# Patient Record
Sex: MALE | Race: BLACK OR AFRICAN AMERICAN | URBAN - METROPOLITAN AREA
[De-identification: names, ages, dates, MRNs, and addresses within clinical notes are randomized per-mention and may not be internally consistent; named-entity substitution may affect disease eponyms.]

---

## 2017-01-03 ENCOUNTER — OFFICE VISIT (OUTPATIENT)
Dept: INTERNAL MEDICINE CLINIC | Age: 27
End: 2017-01-03

## 2017-01-03 VITALS
BODY MASS INDEX: 31.34 KG/M2 | DIASTOLIC BLOOD PRESSURE: 80 MMHG | WEIGHT: 211.6 LBS | RESPIRATION RATE: 20 BRPM | HEIGHT: 69 IN | SYSTOLIC BLOOD PRESSURE: 140 MMHG | TEMPERATURE: 98 F | HEART RATE: 82 BPM | OXYGEN SATURATION: 99 %

## 2017-01-03 DIAGNOSIS — R73.03 PREDIABETES: ICD-10-CM

## 2017-01-03 DIAGNOSIS — R03.0 PREHYPERTENSION: ICD-10-CM

## 2017-01-03 DIAGNOSIS — R73.9 ELEVATED BLOOD SUGAR: Primary | ICD-10-CM

## 2017-01-03 DIAGNOSIS — E78.6 LOW HDL (UNDER 40): ICD-10-CM

## 2017-01-03 DIAGNOSIS — E55.9 VITAMIN D DEFICIENCY: ICD-10-CM

## 2017-01-03 LAB — HBA1C MFR BLD HPLC: 5.7 %

## 2017-01-03 RX ORDER — ERGOCALCIFEROL 1.25 MG/1
50000 CAPSULE ORAL
Qty: 8 CAP | Refills: 1 | Status: SHIPPED | OUTPATIENT
Start: 2017-01-03 | End: 2017-02-22

## 2017-01-03 NOTE — MR AVS SNAPSHOT
Visit Information Date & Time Provider Department Dept. Phone Encounter #  
 1/3/2017  1:30 PM Suad Beard MD [facility] and [address] [place] [address] [phone] [id number] Follow-up Instructions Return for Blood Pressure, weight. Upcoming Health Maintenance Date Due DTaP/Tdap/Td series (1 - Tdap) 2/15/2009 INFLUENZA AGE 9 TO ADULT 8/1/2016 Allergies as of 1/3/2017  Review Complete On: 1/3/2017 By: Suad Beard MD  
 Not on File Current Immunizations  Never Reviewed No immunizations on file. Not reviewed this visit You Were Diagnosed With   
  
 Codes Comments Elevated blood sugar    -  Primary ICD-10-CM: R73.9 ICD-9-CM: 790.29 Vitamin D deficiency     ICD-10-CM: E55.9 ICD-9-CM: 268.9 Low HDL (under 40)     ICD-10-CM: E78.6 ICD-9-CM: 272.5 Prehypertension     ICD-10-CM: R03.0 ICD-9-CM: 796.2 BMI 31.0-31.9,adult     ICD-10-CM: Z68.31 
ICD-9-CM: V85.31 Vitals BP Pulse Temp Resp Height(growth percentile) Weight(growth percentile) 140/80 (BP 1 Location: Right arm, BP Patient Position: Sitting) 82 98 °F (36.7 °C) (Oral) 20 5' 9\" (1.753 m) 211 lb 9.6 oz (96 kg) SpO2 BMI Smoking Status 99% 31.25 kg/m2 Never Smoker Vitals History BMI and BSA Data Body Mass Index Body Surface Area  
 31.25 kg/m 2 2.16 m 2 Preferred Pharmacy Pharmacy Name Phone [id number] [address] [phone] Your Updated Medication List  
  
   
This list is accurate as of: 1/3/17  1:48 PM.  Always use your most recent med list.  
  
  
  
  
 ergocalciferol 50,000 unit capsule Commonly known as:  ERGOCALCIFEROL Take 1 Cap by mouth every seven (7) days for 8 doses. Lab recheck in 8 weeks. Indications: VITAMIN D DEFICIENCY (HIGH DOSE THERAPY) Prescriptions Sent to Pharmacy Refills ergocalciferol (ERGOCALCIFEROL) 50,000 unit capsule 1 Sig: Take 1 Cap by mouth every seven (7) days for 8 doses. Lab recheck in 8 weeks. Indications: VITAMIN D DEFICIENCY (HIGH DOSE THERAPY) Class: Normal  
 Pharmacy: [id number]- [place], [address] [place] [address] #: [phone] Route: Oral  
  
Follow-up Instructions Return for Blood Pressure, weight. Patient Instructions Body Mass Index: Care Instructions Your Care Instructions Body mass index (BMI) can help you see if your weight is raising your risk for health problems. It uses a formula to compare how much you weigh with how tall you are. A BMI between 18.5 and 24.9 is considered healthy. A BMI between 25 and 29.9 is considered overweight. A BMI of 30 or higher is considered obese. Your Body mass index is 31.25 kg/(m^2). Weight 125-175 pd is normal range- this does not account for muscle If your BMI is in the normal range, it means that you have a lower risk for weight-related health problems. If your BMI is in the overweight or obese range, you may be at increased risk for weight-related health problems, such as high blood pressure, heart disease, stroke, arthritis or joint pain, and diabetes. BMI is just one measure of your risk for weight-related health problems. You may be at higher risk for health problems if you are not active, you eat an unhealthy diet, or you drink too much alcohol or use tobacco products. Follow-up care is a key part of your treatment and safety. Be sure to make and go to all appointments, and call your doctor if you are having problems. It's also a good idea to know your test results and keep a list of the medicines you take. How can you care for yourself at home? · Practice healthy eating habits. This includes eating plenty of fruits, vegetables, whole grains, lean protein, and low-fat dairy. · Get at least 30 minutes of exercise 5 days a week or more.  Brisk walking is a good choice. You also may want to do other activities, such as running, swimming, cycling, or playing tennis or team sports. · Do not smoke. Smoking can increase your risk for health problems. If you need help quitting, talk to your doctor about stop-smoking programs and medicines. These can increase your chances of quitting for good. · Limit alcohol to 2 drinks a day for men and 1 drink a day for women. Too much alcohol can cause health problems. If you have a BMI higher than 25 · Your doctor may do other tests to check your risk for weight-related health problems. This may include measuring the distance around your waist. A waist measurement of more than 40 inches in men or 35 inches in women can increase the risk of weight-related health problems. · Talk with your doctor about steps you can take to stay healthy or improve your health. You may need to make lifestyle changes to lose weight and stay healthy, such as changing your diet and getting regular exercise. Where can you learn more? Go to http://robyn-shani.info/. Enter S176 in the search box to learn more about \"Body Mass Index: Care Instructions. \" Current as of: February 16, 2016 Content Version: 11.1 © 7862-1326 [employer]. Care instructions adapted under license by [employer] (which disclaims liability or warranty for this information). If you have questions about a medical condition or this instruction, always ask your healthcare professional. [address] any warranty or liability for your use of this information. Learning About Vitamin D Why is it important to get enough vitamin D? Your body needs vitamin D to absorb calcium. Calcium keeps your bones and muscles, including your heart, healthy and strong. If your muscles don't get enough calcium, they can cramp, hurt, or feel weak. You may have long-term (chronic) muscle aches and pains. If you don't get enough vitamin D throughout life, you have an increased chance of having thin and brittle bones (osteoporosis) in your later years. Children who don't get enough vitamin D may not grow as much as others their age. They also have a chance of getting a rare disease called rickets. It causes weak bones. Vitamin D and calcium are added to many foods. And your body uses sunshine to make its own vitamin D. How much vitamin D do you need? The [place] of Medicine recommends that people ages 3 through 79 get 600 IU (international units) every day. Adults 71 and older need 800 IU every day. Blood tests for vitamin D can check your vitamin D level. But there is no standard normal range used by all laboratories. The [place] of Medicine recommends a blood level of 20 ng/mL of vitamin D for healthy bones. And most people in the [place] and [facility] ([facility]) meet this goal. 
How can you get more vitamin D? Foods that contain vitamin D include: 
· [place], tuna, and mackerel. These are some of the best foods to eat when you need to get more vitamin D. 
· Cheese, egg yolks, and beef liver. These foods have vitamin D in small amounts. · Milk, soy drinks, orange juice, yogurt, margarine, and some kinds of cereal have vitamin D added to them. Some people don't make vitamin D as well as others. They may have to take extra care in getting enough vitamin D. Things that reduce how much vitamin D your body makes include: · Dark skin, such as many  Americans have. · Age, especially if you are older than 72. · Digestive problems, such as Crohn's or celiac disease. · Liver and kidney disease. Some people who do not get enough vitamin D may need supplements. Are there any risks from taking vitamin D? 
· Too much vitamin D: 
¨ Can damage your kidneys. ¨ Can cause nausea and vomiting, constipation, and weakness. ¨ Raises the amount of calcium in your blood.  If this happens, you can get confused or have an irregular heart rhythm. · Vitamin D may interact with other medicines. Tell your doctor about all of the medicines you take, including over-the-counter drugs, herbs, and pills. Tell your doctor about all of your current medical problems. Where can you learn more? Go to http://sue.info/. Enter [id number] in the search box to learn more about \"Learning About Vitamin D.\" 
Current as of: July 26, 2016 Content Version: 11.1 © 7212-9541 [employer]. Care instructions adapted under license by [employer] (which disclaims liability or warranty for this information). If you have questions about a medical condition or this instruction, always ask your healthcare professional. [address] any warranty or liability for your use of this information. Introducing \[facility]\"" & HEALTH SERVICES! Charleen Tse introduces [employer] patient portal. Now you can access parts of your medical record, email your doctor's office, and request medication refills online. 1. In your internet browser, go to [email or web address] 2. Click on the First Time User? Click Here link in the Sign In box. You will see the New Member Sign Up page. 3. Enter your [employer] Access Code exactly as it appears below. You will not need to use this code after youve completed the sign-up process. If you do not sign up before the expiration date, you must request a new code. · [employer] Access Code: [email or web address] Expires: 3/9/2017 10:27 AM 
 
4. Enter the last four digits of your Social Security Number (xxxx) and Date of Birth (mm/dd/yyyy) as indicated and click Submit. You will be taken to the next sign-up page. 5. Create a [employer] ID. This will be your [employer] login ID and cannot be changed, so think of one that is secure and easy to remember. 6. Create a Meme Appst password. You can change your password at any time. 7. Enter your Password Reset Question and Answer. This can be used at a later time if you forget your password. 8. Enter your e-mail address. You will receive e-mail notification when new information is available in [address] 9. Click Sign Up. You can now view and download portions of your medical record. 10. Click the Download Summary menu link to download a portable copy of your medical information. If you have questions, please visit the Frequently Asked Questions section of the Backspaces website. Remember, Back[employer] is NOT to be used for urgent needs. For medical emergencies, dial 911. Now available from your iPhone and Android! Please provide this summary of care documentation to your next provider. Your primary care clinician is listed as [employer] If you have any questions after today's visit, please call [phone].

## 2017-01-03 NOTE — PROGRESS NOTES
Mr. Cesar Chou is a 27y.o. year old male who had concerns including Results and Labs. HPI:  Chief Complaint   Patient presents with    Results    Labs     poc a1c   Sx with BS in the upper 60s. Admits to eating lots of sweets. Low exercise except push ups. Low HDL and elevated FBS. Screen for DM risk indicated. Lab Results   Component Value Date/Time    Hemoglobin A1c (POC) 5.7 01/03/2017 01:19 PM     Your blood sugar levels are elevated. This shows you are at risk for worsening and developing diabetes. Keep your follow up appointment so we can discuss early aggressive intervention and treatment plan. Or make an appointment in 1-2 weeks. HgbA1C Reference range: (average blood sugar)  Normal: <5.5 %  ( [id number])  Increased risk for diabetes: 5.7 - 6.4% ( avg 120s)  Diabetes: >6.4% (avg 130s)  Glycemic control for adults with diabetes: <7.0 % (avg 140 or greater)          Vitamin D Deficiency- vitamin D levels show significantly low levels requiring vitamin D high dose repletion. Patient has completed 8 weeks of therapy and is here today to check levels. No neg SE with the medication. No renal, hepatic or known GI malabsorption disorder. Limited sun exposure. No proximal muscle weakness, myalgias or lower back pain. Lab Results   Component Value Date/Time    VITAMIN D, 25-HYDROXY 20.5 12/09/2016 11:20 AM             No past medical history on file. Current Outpatient Prescriptions   Medication Sig Dispense    ergocalciferol (ERGOCALCIFEROL) 50,000 unit capsule Take 1 Cap by mouth every seven (7) days for 8 doses. Lab recheck in 8 weeks. Indications: VITAMIN D DEFICIENCY (HIGH DOSE THERAPY) 8 Cap     No current facility-administered medications for this visit. Reviewed PmHx, RxHx, FmHx, SocHx, AllgHx and updated and dated in the chart.     ROS: Negative except for BOLD  General: fever, chills, fatigue  Respiratory: cough, SOB, wheezing  Cardiovascular:  CP, palpitation, SAMPSON, edema Gastrointestinal: N/V/D, bleeding  Genito-Urinary: dysuria, hematuria  Musculoskeletal: muscle weakness, pain, swelling    OBJECTIVE:   Visit Vitals    /80 (BP 1 Location: Right arm, BP Patient Position: Sitting)    Pulse 82    Temp 98 °F (36.7 °C) (Oral)    Resp 20    Ht 5' 9\" (1.753 m)    Wt 211 lb 9.6 oz (96 kg)    SpO2 99%    BMI 31.25 kg/m2     GEN: The patient appears well, alert, oriented x 3, in no distress. ENT: bilateral TM and canal normal.  Neck supple. No adenopathy or thyromegaly. PEYTON. Lungs: clear bilaterally, good air entry, no wheezes, rhonchi or rales. Cardiovascular: regular rate and rhythm. S1 and S2 normal, no murmurs,  Abdomen: + BS, soft without tenderness, guarding, rebound, mass or organomegaly. Extremities: no edema, normal peripheral pulses. Neurological: normal, gross sensory and motor in tact without focal findings. Results for orders placed or performed in visit on 01/03/17   AMB POC HEMOGLOBIN A1C   Result Value Ref Range    Hemoglobin A1c (POC) 5.7 %         Assessment/ Plan:       ICD-10-CM ICD-9-CM    1. Elevated blood sugar R73.9 790.29 AMB POC HEMOGLOBIN A1C  Above normal a1c   2. Vitamin D deficiency E55.9 268.9 ergocalciferol (ERGOCALCIFEROL) 50,000 unit capsule   3. Low HDL (under 40) E78.6 272.5    4. Prehypertension R03.0 796.2    5. BMI 31.0-31.9,adult Z68.31 V85.31      Body mass index is 31.25 kg/(m^2). 5.9 and 211. Lost 8 pounds since last visit. Lifestyle modifications discussed in detail for prevention of DM and HTN and obesity. I have discussed the diagnosis with the patient and the intended plan as seen in the above orders. The patient has received an after-visit summary and questions were answered concerning future plans. Medication Side Effects and Warnings were discussed with patient. Follow-up Disposition:  Return for Blood Pressure, weight.       Jamil Ayers MD

## 2017-01-03 NOTE — PATIENT INSTRUCTIONS
Body Mass Index: Care Instructions  Your Care Instructions    Body mass index (BMI) can help you see if your weight is raising your risk for health problems. It uses a formula to compare how much you weigh with how tall you are. A BMI between 18.5 and 24.9 is considered healthy. A BMI between 25 and 29.9 is considered overweight. A BMI of 30 or higher is considered obese. Your Body mass index is 31.25 kg/(m^2). Weight 125-175 pd is normal range- this does not account for muscle    If your BMI is in the normal range, it means that you have a lower risk for weight-related health problems. If your BMI is in the overweight or obese range, you may be at increased risk for weight-related health problems, such as high blood pressure, heart disease, stroke, arthritis or joint pain, and diabetes. BMI is just one measure of your risk for weight-related health problems. You may be at higher risk for health problems if you are not active, you eat an unhealthy diet, or you drink too much alcohol or use tobacco products. Follow-up care is a key part of your treatment and safety. Be sure to make and go to all appointments, and call your doctor if you are having problems. It's also a good idea to know your test results and keep a list of the medicines you take. How can you care for yourself at home? · Practice healthy eating habits. This includes eating plenty of fruits, vegetables, whole grains, lean protein, and low-fat dairy. · Get at least 30 minutes of exercise 5 days a week or more. Brisk walking is a good choice. You also may want to do other activities, such as running, swimming, cycling, or playing tennis or team sports. · Do not smoke. Smoking can increase your risk for health problems. If you need help quitting, talk to your doctor about stop-smoking programs and medicines. These can increase your chances of quitting for good. · Limit alcohol to 2 drinks a day for men and 1 drink a day for women.  Too much alcohol can cause health problems. If you have a BMI higher than 25  · Your doctor may do other tests to check your risk for weight-related health problems. This may include measuring the distance around your waist. A waist measurement of more than 40 inches in men or 35 inches in women can increase the risk of weight-related health problems. · Talk with your doctor about steps you can take to stay healthy or improve your health. You may need to make lifestyle changes to lose weight and stay healthy, such as changing your diet and getting regular exercise. Where can you learn more? Go to http://robyn-shani.info/. Enter S176 in the search box to learn more about \"Body Mass Index: Care Instructions. \"  Current as of: February 16, 2016  Content Version: 11.1  © 2263-2467 [employer]. Care instructions adapted under license by [employer] (which disclaims liability or warranty for this information). If you have questions about a medical condition or this instruction, always ask your healthcare professional. [address] any warranty or liability for your use of this information. Learning About Vitamin D  Why is it important to get enough vitamin D? Your body needs vitamin D to absorb calcium. Calcium keeps your bones and muscles, including your heart, healthy and strong. If your muscles don't get enough calcium, they can cramp, hurt, or feel weak. You may have long-term (chronic) muscle aches and pains. If you don't get enough vitamin D throughout life, you have an increased chance of having thin and brittle bones (osteoporosis) in your later years. Children who don't get enough vitamin D may not grow as much as others their age. They also have a chance of getting a rare disease called rickets. It causes weak bones. Vitamin D and calcium are added to many foods. And your body uses sunshine to make its own vitamin D.   How much vitamin D do you need?  The [place] of Medicine recommends that people ages 3 through 79 get 600 IU (international units) every day. Adults 71 and older need 800 IU every day. Blood tests for vitamin D can check your vitamin D level. But there is no standard normal range used by all laboratories. The [place] of Medicine recommends a blood level of 20 ng/mL of vitamin D for healthy bones. And most people in the [place] and [facility]) meet this goal.  How can you get more vitamin D? Foods that contain vitamin D include:  · [place], tuna, and mackerel. These are some of the best foods to eat when you need to get more vitamin D.  · Cheese, egg yolks, and beef liver. These foods have vitamin D in small amounts. · Milk, soy drinks, orange juice, yogurt, margarine, and some kinds of cereal have vitamin D added to them. Some people don't make vitamin D as well as others. They may have to take extra care in getting enough vitamin D. Things that reduce how much vitamin D your body makes include:  · Dark skin, such as many  Americans have. · Age, especially if you are older than 72. · Digestive problems, such as Crohn's or celiac disease. · Liver and kidney disease. Some people who do not get enough vitamin D may need supplements. Are there any risks from taking vitamin D?  · Too much vitamin D:  ¨ Can damage your kidneys. ¨ Can cause nausea and vomiting, constipation, and weakness. ¨ Raises the amount of calcium in your blood. If this happens, you can get confused or have an irregular heart rhythm. · Vitamin D may interact with other medicines. Tell your doctor about all of the medicines you take, including over-the-counter drugs, herbs, and pills. Tell your doctor about all of your current medical problems. Where can you learn more? Go to http://robyn-shani.info/.   Enter [id number] in the search box to learn more about \"Learning About Vitamin D.\"  Current as of: July 26, 2016  Content Version: 11.1  © 7723-3442 [employer]. Care instructions adapted under license by [employer] (which disclaims liability or warranty for this information). If you have questions about a medical condition or this instruction, always ask your healthcare professional. [address] any warranty or liability for your use of this information.

## 2017-06-08 ENCOUNTER — OFFICE VISIT (OUTPATIENT)
Dept: INTERNAL MEDICINE CLINIC | Age: 27
End: 2017-06-08

## 2017-06-08 VITALS
TEMPERATURE: 98.5 F | HEIGHT: 69 IN | DIASTOLIC BLOOD PRESSURE: 85 MMHG | HEART RATE: 81 BPM | SYSTOLIC BLOOD PRESSURE: 134 MMHG | RESPIRATION RATE: 20 BRPM | BODY MASS INDEX: 31.95 KG/M2 | WEIGHT: 215.7 LBS | OXYGEN SATURATION: 98 %

## 2017-06-08 DIAGNOSIS — E55.9 VITAMIN D DEFICIENCY: ICD-10-CM

## 2017-06-08 DIAGNOSIS — R03.0 PREHYPERTENSION: ICD-10-CM

## 2017-06-08 DIAGNOSIS — Z20.89 MENINGITIS EXPOSURE: ICD-10-CM

## 2017-06-08 DIAGNOSIS — R10.9 FLANK PAIN: ICD-10-CM

## 2017-06-08 DIAGNOSIS — R73.03 PREDIABETES: ICD-10-CM

## 2017-06-08 DIAGNOSIS — R31.9 HEMATURIA: Primary | ICD-10-CM

## 2017-06-08 LAB
BILIRUB UR QL STRIP: NEGATIVE
GLUCOSE UR-MCNC: NEGATIVE MG/DL
KETONES P FAST UR STRIP-MCNC: NEGATIVE MG/DL
PH UR STRIP: 7 [PH] (ref 4.6–8)
PROT UR QL STRIP: NEGATIVE MG/DL
SP GR UR STRIP: 1.01 (ref 1–1.03)
UA UROBILINOGEN AMB POC: NORMAL (ref 0.2–1)
URINALYSIS CLARITY POC: CLEAR
URINALYSIS COLOR POC: YELLOW
URINE BLOOD POC: NORMAL
URINE LEUKOCYTES POC: NEGATIVE
URINE NITRITES POC: NEGATIVE

## 2017-06-08 RX ORDER — SULFAMETHOXAZOLE AND TRIMETHOPRIM 800; 160 MG/1; MG/1
1 TABLET ORAL 2 TIMES DAILY
Qty: 20 TAB | Refills: 0 | Status: SHIPPED | OUTPATIENT
Start: 2017-06-08 | End: 2017-06-18

## 2017-06-08 NOTE — PROGRESS NOTES
Mr. Samantha Puentes is a 32y.o. year old male who had concerns including Flank Pain and Cyst.    HPI:  Chief Complaint   Patient presents with    Flank Pain     left side, polyura (lots of water intake),     Cyst     lateral right knee      Coworker with meningitis  recently. No neck pain or fevers or HA or change in vision. PreDM, Prehypertension, overweight- Body mass index is 31.85 kg/(m^2). History reviewed. No pertinent past medical history. Current Outpatient Prescriptions   Medication Sig Dispense    trimethoprim-sulfamethoxazole (BACTRIM DS, SEPTRA DS) 160-800 mg per tablet Take 1 Tab by mouth two (2) times a day for 10 days. 20 Tab     No current facility-administered medications for this visit. Reviewed PmHx, RxHx, FmHx, SocHx, AllgHx and updated and dated in the chart. ROS: Negative except for BOLD  General: fever, chills, fatigue  Respiratory: cough, SOB, wheezing  Cardiovascular:  CP, palpitation, SAMPSON, edema   Gastrointestinal: N/V/D, bleeding  Genito-Urinary: dysuria, hematuria  Musculoskeletal: muscle weakness, pain, swelling    OBJECTIVE:   Visit Vitals    /85 (BP 1 Location: Left arm, BP Patient Position: Sitting)    Pulse 81    Temp 98.5 °F (36.9 °C) (Oral)    Resp 20    Ht 5' 9\" (1.753 m)    Wt 215 lb 11.2 oz (97.8 kg)    SpO2 98%    BMI 31.85 kg/m2     GEN: The patient appears well, alert, oriented x 3, in no distress. ENT: bilateral TM and canal normal.  Neck supple. No adenopathy or thyromegaly. PEYTON. Lungs: clear bilaterally, good air entry, no wheezes, rhonchi or rales. Cardiovascular: regular rate and rhythm. S1 and S2 normal, no murmurs,  Abdomen: + BS, soft without tenderness, guarding, rebound, mass or organomegaly. Extremities: no edema, normal peripheral pulses. Neurological: normal, gross sensory and motor in tact without focal findings. Right thigh- subcutaneous nodules, non tender, non fluctuant.      Assessment/ Plan: ICD-10-CM ICD-9-CM    1. Hematuria R31.9 599.70 CULTURE, URINE      trimethoprim-sulfamethoxazole (BACTRIM DS, SEPTRA DS) 160-800 mg per tablet   2. Prediabetes X82.11 912.62 METABOLIC PANEL, BASIC      VITAMIN D, 25 HYDROXY      HEMOGLOBIN A1C WITH EAG      [place] COLLECTION VENOUS BLOOD,VENIPUNCTURE   3. BMI 31.0-31.9,adult Z68.31 V85.31    4. Prehypertension U43.9 896.6 METABOLIC PANEL, BASIC   5. Vitamin D deficiency E55.9 268.9 VITAMIN D, 25 HYDROXY   6. Meningitis exposure Z20.89 V01.89        Benign subq fatty deposisits supsected on right thigh. Monitor for changes. Close contact not enough to indicate ppx, pt asymtpomatic. Will monitor. I have discussed the diagnosis with the patient and the intended plan as seen in the above orders. The patient has received an after-visit summary and questions were answered concerning future plans. Medication Side Effects and Warnings were discussed with patient. Follow-up Disposition:  Return in about 4 weeks (around 7/6/2017), or if symptoms worsen or fail to improve, for repeat urine (blood in urine).       Deloise Gowers, MD

## 2017-06-08 NOTE — MR AVS SNAPSHOT
Visit Information Date & Time Provider Department Dept. Phone Encounter #  
 6/8/2017 11:00 AM Ana Watts MD [facility] and [address] [place] [address] [phone] [id number] Follow-up Instructions Return in about 4 weeks (around 7/6/2017), or if symptoms worsen or fail to improve, for repeat urine (blood in urine). Follow-up and Disposition History Upcoming Health Maintenance Date Due DTaP/Tdap/Td series (1 - Tdap) 2/15/2009 INFLUENZA AGE 9 TO ADULT 8/1/2017 Allergies as of 6/8/2017  Review Complete On: 6/8/2017 By: Sohan Roman LPN No Known Allergies Current Immunizations  Never Reviewed No immunizations on file. Not reviewed this visit You Were Diagnosed With   
  
 Codes Comments Hematuria    -  Primary ICD-10-CM: R31.9 ICD-9-CM: 599.70 Prediabetes     ICD-10-CM: R73.03 
ICD-9-CM: 790.29 BMI 31.0-31.9,adult     ICD-10-CM: Z68.31 
ICD-9-CM: V85.31 Prehypertension     ICD-10-CM: R03.0 ICD-9-CM: 796.2 Vitamin D deficiency     ICD-10-CM: E55.9 ICD-9-CM: 268.9 Meningitis exposure     ICD-10-CM: Z20.89 ICD-9-CM: V01.89 Vitals BP Pulse Temp Resp Height(growth percentile) Weight(growth percentile) 134/85 (BP 1 Location: Left arm, BP Patient Position: Sitting) 81 98.5 °F (36.9 °C) (Oral) 20 5' 9\" (1.753 m) 215 lb 11.2 oz (97.8 kg) SpO2 BMI Smoking Status 98% 31.85 kg/m2 Never Smoker BMI and BSA Data Body Mass Index Body Surface Area  
 31.85 kg/m 2 2.18 m 2 Preferred Pharmacy Pharmacy Name Phone [id number] [phone] Your Updated Medication List  
  
   
This list is accurate as of: 6/8/17 12:13 PM.  Always use your most recent med list.  
  
  
  
  
 trimethoprim-sulfamethoxazole 160-800 mg per tablet Commonly known as:  BACTRIM DS, SEPTRA DS Take 1 Tab by mouth two (2) times a day for 10 days. Prescriptions Sent to Pharmacy Refills  
 trimethoprim-sulfamethoxazole (BACTRIM DS, SEPTRA DS) 160-800 mg per tablet 0 Sig: Take 1 Tab by mouth two (2) times a day for 10 days. Class: Normal  
 Pharmacy: [id number] [place], [address] [place] [address] #: [phone] Route: Oral  
  
We Performed the Following CULTURE, URINE [id number] CPT(R)] HEMOGLOBIN A1C WITH EAG [52691 CPT(R)] METABOLIC PANEL, BASIC [46368 CPT(R)] TN COLLECTION VENOUS BLOOD,VENIPUNCTURE [id number] CPT(R)] VITAMIN D, 25 HYDROXY [id number] CPT(R)] Follow-up Instructions Return in about 4 weeks (around 7/6/2017), or if symptoms worsen or fail to improve, for repeat urine (blood in urine). Patient Instructions Urinary Tract Infections in Men: Care Instructions Your Care Instructions A urinary tract infection, or UTI, is a general term for an infection anywhere between the kidneys and the tip of the penis. UTIs can also be a result of a prostate problem. Most cause pain or burning when you urinate. Most UTIs are caused by bacteria and can be cured with antibiotics. It is important to complete your treatment so that the infection does not get worse. Follow-up care is a key part of your treatment and safety. Be sure to make and go to all appointments, and call your doctor if you are having problems. It's also a good idea to know your test results and keep a list of the medicines you take. How can you care for yourself at home? · Take your antibiotics as prescribed. Do not stop taking them just because you feel better. You need to take the full course of antibiotics. · Take your medicines exactly as prescribed. Your doctor may have prescribed a medicine, such as phenazopyridine (Pyridium), to help relieve pain when you urinate. This turns your urine orange. You may stop taking it when your symptoms get better.  But be sure to take all of your antibiotics, which treat the infection. · Drink extra water for the next day or two. This will help make the urine less concentrated and help wash out the bacteria causing the infection. (If you have kidney, heart, or liver disease and have to limit your fluids, talk with your doctor before you increase your fluid intake.) · Avoid drinks that are carbonated or have caffeine. They can irritate the bladder. · Urinate often. Try to empty your bladder each time. · To relieve pain, take a hot bath or lay a heating pad (set on low) over your lower belly or genital area. Never go to sleep with a heating pad in place. To help prevent UTIs · Drink plenty of fluids, enough so that your urine is light yellow or clear like water. If you have kidney, heart, or liver disease and have to limit fluids, talk with your doctor before you increase the amount of fluids you drink. · Urinate when you have the urge. Do not hold your urine for a long time. Urinate before you go to sleep. · Keep your penis clean. Catheter care If you have a drainage tube (catheter) in place, the following steps will help you care for it. · Always wash your hands before and after touching your catheter. · Check the area around the urethra for inflammation or signs of infection. Signs of infection include irritated, swollen, red, or tender skin, or pus around the catheter. · Clean the area around the catheter with soap and water two times a day. Dry with a clean towel afterward. · Do not apply powder or lotion to the skin around the catheter. To empty the urine collection bag · Wash your hands with soap and water. · Without touching the drain spout, remove the spout from its sleeve at the bottom of the collection bag. Open the valve on the spout. · Let the urine flow out of the bag and into the toilet or a container. Do not let the tubing or drain spout touch anything.  
· After you empty the bag, clean the end of the drain spout with tissue and water. Close the valve and put the drain spout back into its sleeve at the bottom of the collection bag. · Wash your hands with soap and water. When should you call for help? Call your doctor now or seek immediate medical care if: · Symptoms such as a fever, chills, nausea, or vomiting get worse or happen for the first time. · You have new pain in your back just below your rib cage. This is called flank pain. · There is new blood or pus in your urine. · You are not able to take or keep down your antibiotics. Watch closely for changes in your health, and be sure to contact your doctor if: 
· You are not getting better after taking an antibiotic for 2 days. · Your symptoms go away but then come back. Where can you learn more? Go to http://robyn-shani.info/. Enter G916 in the search box to learn more about \"Urinary Tract Infections in Men: Care Instructions. \" Current as of: November 28, 2016 Content Version: 11.2 © 1621-5936 [employer] Dive. Care instructions adapted under license by Sofea (which disclaims liability or warranty for this information). If you have questions about a medical condition or this instruction, always ask your healthcare professional. [place] [address] any warranty or liability for your use of this information. Bacterial Meningitis: Care Instructions Your Care Instructions Bacterial meningitis is an infection of the tissues that surround the brain and spinal cord. This serious infection can injure the brain. It can be life-threatening. How long it takes you to get better depends on how bad the illness is. It can take from just a couple of weeks to many months. You may have changes in how you think or concentrate. Most people with these symptoms get better over time. Be patient. Follow your doctor's instructions. Follow-up care is a key part of your treatment and safety.  Be sure to make and go to all appointments, and call your doctor if you are having problems. It's also a good idea to know your test results and keep a list of the medicines you take. How can you care for yourself at home? · If your doctor prescribed antibiotics, take them as directed. Do not stop taking them just because you feel better. You need to take the full course of antibiotics. · Take an over-the-counter pain medicine, such as acetaminophen (Tylenol), ibuprofen (Advil, Motrin), or naproxen (Aleve) for pain or fever. Be safe with medicines. Read and follow all instructions on the label. · Do not take two or more pain medicines at the same time unless the doctor told you to. Many pain medicines have acetaminophen, which is Tylenol. Too much acetaminophen (Tylenol) can be harmful. · Get plenty of rest. 
· Talk with your doctor about any new symptoms that develop, such as changes in your hearing or trouble concentrating. · Be sure that anyone who has come into close contact with you during this illness calls a doctor if he or she feels sick. When should you call for help? Call 911 anytime you think you may need emergency care. For example, call if: 
· You have a seizure. Call your doctor now or seek immediate medical care if: 
· Your symptoms come back or get worse. These may include: ¨ A fever. ¨ A severe headache. ¨ A stiff neck. ¨ Nausea and vomiting. · You have trouble thinking or concentrating. Watch closely for changes in your health, and be sure to contact your doctor if: 
· You do not get better as expected. Where can you learn more? Go to http://robyn-shani.info/. Enter M535 in the search box to learn more about \"Bacterial Meningitis: Care Instructions. \" Current as of: May 24, 2016 Content Version: 11.2 © 5198-1783 [employer]Smile[employer].  Care instructions adapted under license by Klash (which disclaims liability or warranty for this information). If you have questions about a medical condition or this instruction, always ask your healthcare professional. [address] any warranty or liability for your use of this information. Patient Instructions History Introducing \[facility]\"" & HEALTH SERVICES! [employer] introduces PagoPago patient portal. Now you can access parts of your medical record, email your doctor's office, and request medication refills online. 1. In your internet browser, go to [email or web address] [email or web address]. Drop â€™til you Shop/CRE Secure 2. Click on the First Time User? Click Here link in the Sign In box. You will see the New Member Sign Up page. 3. Enter your PagoPago Access Code exactly as it appears below. You will not need to use this code after youve completed the sign-up process. If you do not sign up before the expiration date, you must request a new code. · PagoPago Access Code: [email or web address] Expires: 9/6/2017 12:13 PM 
 
4. Enter the last four digits of your Social Security Number (xxxx) and Date of Birth (mm/dd/yyyy) as indicated and click Submit. You will be taken to the next sign-up page. 5. Create a PagoPago ID. This will be your PagoPago login ID and cannot be changed, so think of one that is secure and easy to remember. 6. Create a PagoPago password. You can change your password at any time. 7. Enter your Password Reset Question and Answer. This can be used at a later time if you forget your password. 8. Enter your e-mail address. You will receive e-mail notification when new information is available in [address] 9. Click Sign Up. You can now view and download portions of your medical record. 10. Click the Download Summary menu link to download a portable copy of your medical information. If you have questions, please visit the Frequently Asked Questions section of the PagoPago website. Remember, PagoPago is NOT to be used for urgent needs. For medical emergencies, dial 911. Now available from your iPhone and Android! Please provide this summary of care documentation to your next provider. Your primary care clinician is listed as Jacek Solomon. If you have any questions after today's visit, please call [phone].

## 2017-06-08 NOTE — PATIENT INSTRUCTIONS
Urinary Tract Infections in Men: Care Instructions  Your Care Instructions    A urinary tract infection, or UTI, is a general term for an infection anywhere between the kidneys and the tip of the penis. UTIs can also be a result of a prostate problem. Most cause pain or burning when you urinate. Most UTIs are caused by bacteria and can be cured with antibiotics. It is important to complete your treatment so that the infection does not get worse. Follow-up care is a key part of your treatment and safety. Be sure to make and go to all appointments, and call your doctor if you are having problems. It's also a good idea to know your test results and keep a list of the medicines you take. How can you care for yourself at home? · Take your antibiotics as prescribed. Do not stop taking them just because you feel better. You need to take the full course of antibiotics. · Take your medicines exactly as prescribed. Your doctor may have prescribed a medicine, such as phenazopyridine (Pyridium), to help relieve pain when you urinate. This turns your urine orange. You may stop taking it when your symptoms get better. But be sure to take all of your antibiotics, which treat the infection. · Drink extra water for the next day or two. This will help make the urine less concentrated and help wash out the bacteria causing the infection. (If you have kidney, heart, or liver disease and have to limit your fluids, talk with your doctor before you increase your fluid intake.)  · Avoid drinks that are carbonated or have caffeine. They can irritate the bladder. · Urinate often. Try to empty your bladder each time. · To relieve pain, take a hot bath or lay a heating pad (set on low) over your lower belly or genital area. Never go to sleep with a heating pad in place. To help prevent UTIs  · Drink plenty of fluids, enough so that your urine is light yellow or clear like water.  If you have kidney, heart, or liver disease and have to limit fluids, talk with your doctor before you increase the amount of fluids you drink. · Urinate when you have the urge. Do not hold your urine for a long time. Urinate before you go to sleep. · Keep your penis clean. Catheter care  If you have a drainage tube (catheter) in place, the following steps will help you care for it. · Always wash your hands before and after touching your catheter. · Check the area around the urethra for inflammation or signs of infection. Signs of infection include irritated, swollen, red, or tender skin, or pus around the catheter. · Clean the area around the catheter with soap and water two times a day. Dry with a clean towel afterward. · Do not apply powder or lotion to the skin around the catheter. To empty the urine collection bag  · Wash your hands with soap and water. · Without touching the drain spout, remove the spout from its sleeve at the bottom of the collection bag. Open the valve on the spout. · Let the urine flow out of the bag and into the toilet or a container. Do not let the tubing or drain spout touch anything. · After you empty the bag, clean the end of the drain spout with tissue and water. Close the valve and put the drain spout back into its sleeve at the bottom of the collection bag. · Wash your hands with soap and water. When should you call for help? Call your doctor now or seek immediate medical care if:  · Symptoms such as a fever, chills, nausea, or vomiting get worse or happen for the first time. · You have new pain in your back just below your rib cage. This is called flank pain. · There is new blood or pus in your urine. · You are not able to take or keep down your antibiotics. Watch closely for changes in your health, and be sure to contact your doctor if:  · You are not getting better after taking an antibiotic for 2 days. · Your symptoms go away but then come back. Where can you learn more?   Go to http://robyn-shani.info/. Enter J567 in the search box to learn more about \"Urinary Tract Infections in Men: Care Instructions. \"  Current as of: November 28, 2016  Content Version: 11.2  © 4579-3986 Seekly. Care instructions adapted under license by Respectance (which disclaims liability or warranty for this information). If you have questions about a medical condition or this instruction, always ask your healthcare professional. [address] any warranty or liability for your use of this information. Bacterial Meningitis: Care Instructions  Your Care Instructions    Bacterial meningitis is an infection of the tissues that surround the brain and spinal cord. This serious infection can injure the brain. It can be life-threatening. How long it takes you to get better depends on how bad the illness is. It can take from just a couple of weeks to many months. You may have changes in how you think or concentrate. Most people with these symptoms get better over time. Be patient. Follow your doctor's instructions. Follow-up care is a key part of your treatment and safety. Be sure to make and go to all appointments, and call your doctor if you are having problems. It's also a good idea to know your test results and keep a list of the medicines you take. How can you care for yourself at home? · If your doctor prescribed antibiotics, take them as directed. Do not stop taking them just because you feel better. You need to take the full course of antibiotics. · Take an over-the-counter pain medicine, such as acetaminophen (Tylenol), ibuprofen (Advil, Motrin), or naproxen (Aleve) for pain or fever. Be safe with medicines. Read and follow all instructions on the label. · Do not take two or more pain medicines at the same time unless the doctor told you to. Many pain medicines have acetaminophen, which is Tylenol.  Too much acetaminophen (Tylenol) can be harmful. · Get plenty of rest.  · Talk with your doctor about any new symptoms that develop, such as changes in your hearing or trouble concentrating. · Be sure that anyone who has come into close contact with you during this illness calls a doctor if he or she feels sick. When should you call for help? Call 911 anytime you think you may need emergency care. For example, call if:  · You have a seizure. Call your doctor now or seek immediate medical care if:  · Your symptoms come back or get worse. These may include:  ¨ A fever. ¨ A severe headache. ¨ A stiff neck. ¨ Nausea and vomiting. · You have trouble thinking or concentrating. Watch closely for changes in your health, and be sure to contact your doctor if:  · You do not get better as expected. Where can you learn more? Go to http://robyn-shani.info/. Enter M535 in the search box to learn more about \"Bacterial Meningitis: Care Instructions. \"  Current as of: May 24, 2016  Content Version: 11.2  © 0647-4739 [employer]AD [employer]ubator. Care instructions adapted under license by FunCaptcha (which disclaims liability or warranty for this information). If you have questions about a medical condition or this instruction, always ask your healthcare professional. [address] any warranty or liability for your use of this information.

## 2017-06-09 LAB
25(OH)D3+25(OH)D2 SERPL-MCNC: 16.5 NG/ML (ref 30–100)
BUN SERPL-MCNC: 7 MG/DL (ref 6–20)
BUN/CREAT SERPL: 8 (ref 9–20)
CALCIUM SERPL-MCNC: 9.6 MG/DL (ref 8.7–10.2)
CHLORIDE SERPL-SCNC: 101 MMOL/L (ref 96–106)
CO2 SERPL-SCNC: 21 MMOL/L (ref 18–29)
CREAT SERPL-MCNC: 0.92 MG/DL (ref 0.76–1.27)
EST. AVERAGE GLUCOSE BLD GHB EST-MCNC: 126 MG/DL
GLUCOSE SERPL-MCNC: 115 MG/DL (ref 65–99)
HBA1C MFR BLD: 6 % (ref 4.8–5.6)
POTASSIUM SERPL-SCNC: 4.4 MMOL/L (ref 3.5–5.2)
SODIUM SERPL-SCNC: 138 MMOL/L (ref 134–144)

## 2017-06-10 LAB — BACTERIA UR CULT: NO GROWTH

## 2017-07-24 ENCOUNTER — OFFICE VISIT (OUTPATIENT)
Dept: INTERNAL MEDICINE CLINIC | Age: 27
End: 2017-07-24

## 2017-07-24 VITALS
HEART RATE: 79 BPM | WEIGHT: 214.6 LBS | RESPIRATION RATE: 16 BRPM | TEMPERATURE: 98.2 F | HEIGHT: 69 IN | BODY MASS INDEX: 31.78 KG/M2 | OXYGEN SATURATION: 100 % | SYSTOLIC BLOOD PRESSURE: 107 MMHG | DIASTOLIC BLOOD PRESSURE: 67 MMHG

## 2017-07-24 DIAGNOSIS — M54.50 ACUTE LOW BACK PAIN WITHOUT SCIATICA, UNSPECIFIED BACK PAIN LATERALITY: ICD-10-CM

## 2017-07-24 DIAGNOSIS — E55.9 VITAMIN D DEFICIENCY: ICD-10-CM

## 2017-07-24 DIAGNOSIS — R73.03 PREDIABETES: ICD-10-CM

## 2017-07-24 DIAGNOSIS — Z30.09 ENCOUNTER FOR VASECTOMY COUNSELING: ICD-10-CM

## 2017-07-24 DIAGNOSIS — R31.9 HEMATURIA: Primary | ICD-10-CM

## 2017-07-24 LAB
BILIRUB UR QL STRIP: NEGATIVE
GLUCOSE UR-MCNC: NEGATIVE MG/DL
KETONES P FAST UR STRIP-MCNC: NEGATIVE MG/DL
PH UR STRIP: 6.5 [PH] (ref 4.6–8)
PROT UR QL STRIP: NEGATIVE MG/DL
SP GR UR STRIP: 1.02 (ref 1–1.03)
UA UROBILINOGEN AMB POC: NORMAL (ref 0.2–1)
URINALYSIS CLARITY POC: CLEAR
URINALYSIS COLOR POC: NORMAL
URINE BLOOD POC: NEGATIVE
URINE LEUKOCYTES POC: NEGATIVE
URINE NITRITES POC: NEGATIVE

## 2017-07-24 NOTE — PROGRESS NOTES
Mr. Arline Eddy is a 32y.o. year old male who had concerns including Back Pain and Blood in Urine. HPI:  Chief Complaint   Patient presents with    Back Pain- intermittent     (RM4)    Blood in Urine- previously. No gross urine     F/U     Prediabetes- no polyuria, polydipsia, polyphagia. Body mass index is 31.69 kg/(m^2). overweight and needs to lose weight. Poor diet and low exercise. Desire for vasectomy 3rd child on way by 3 mother of baby. Pt does not want more children. Vitamin D Deficiency- vitamin D levels show significantly low levels requiring vitamin D high dose repletion. Patient has completed 8 weeks of therapy and is here today to check levels. No neg SE with the medication. No renal, hepatic or known GI malabsorption disorder. Limited sun exposure. No proximal muscle weakness, myalgias or lower back pain. Lab Results   Component Value Date/Time    VITAMIN D, 25-HYDROXY 16.5 06/08/2017 11:56 AM             No past medical history on file. No current outpatient prescriptions on file. No current facility-administered medications for this visit. Reviewed PmHx, RxHx, FmHx, SocHx, AllgHx and updated and dated in the chart. ROS: Negative except for BOLD  General: fever, chills, fatigue  Respiratory: cough, SOB, wheezing  Cardiovascular:  CP, palpitation, SAMPSON, edema   Gastrointestinal: N/V/D, bleeding  Genito-Urinary: dysuria, hematuria  Musculoskeletal: muscle weakness, pain, swelling    OBJECTIVE:   Visit Vitals    /67 (BP 1 Location: Right arm, BP Patient Position: Sitting)    Pulse 79    Temp 98.2 °F (36.8 °C) (Oral)    Resp 16    Ht 5' 9\" (1.753 m)    Wt 214 lb 9.6 oz (97.3 kg)    SpO2 100%    BMI 31.69 kg/m2     GEN: The patient appears well, alert, oriented x 3, in no distress. ENT: bilateral TM and canal normal.  Neck supple. No adenopathy or thyromegaly. PEYTON. Lungs: clear bilaterally, good air entry, no wheezes, rhonchi or rales. Cardiovascular: regular rate and rhythm. S1 and S2 normal, no murmurs,  Abdomen: + BS, soft without tenderness, guarding, rebound, mass or organomegaly. Extremities: no edema, normal peripheral pulses. Neurological: normal, gross sensory and motor in tact without focal findings. MSK:    Posture: Normal   Deformity: None    ROM:     Flexion: Normal    Extension: Normal     Lateral bending: Normal      Gait: Normal       Palpation:    L1-L5: No tenderness    Sacrum: No tenderness    Coccyx: No tenderness    Left Paraspinal: No tenderness    Right Paraspinal: No tenderness     Strength (0-5/5)    Hip Flexion:   Left: 5/5  Right: 5/5    Hip Extension:  Left: 5/5  Right: 5/5    Hip Abduction:  Left: 5/5  Right: 5/5    Hip Adduction:  Left: 5/5  Right: 5/5    Knee Extension:  Left: 5/5  Right: 5/5    Knee Flexion:   Left: 5/5  Right: 5/5    Ankle dorsiflexion:  Left: 5/5  Right: 5/5    Ankle plantarflexion:  Left: 5/5  Right: 5/5    Great toe extension:  Left: 5/5  Right: 5/5     Sensation: Intact, no deficits       Special test:    Straight leg: Left: Negative  Right: Negative    Isabels: Left: Negative  Right: Negative    Piriformis: Left: Negative  Right: Negative          Results for orders placed or performed in visit on 07/24/17   AMB POC URINALYSIS DIP STICK AUTO W/O MICRO   Result Value Ref Range    Color (UA POC) Dark Yellow     Clarity (UA POC) Clear     Glucose (UA POC) Negative Negative    Bilirubin (UA POC) Negative Negative    Ketones (UA POC) Negative Negative    Specific gravity (UA POC) 1.020 1.001 - 1.035    Blood (UA POC) Negative Negative    pH (UA POC) 6.5 4.6 - 8.0    Protein (UA POC) Negative Negative mg/dL    Urobilinogen (UA POC) 1 mg/dL 0.2 - 1    Nitrites (UA POC) Negative Negative    Leukocyte esterase (UA POC) Negative Negative         Assessment/ Plan:       ICD-10-CM ICD-9-CM    1. Hematuria R31.9 599.70 AMB POC URINALYSIS DIP STICK AUTO W/O MICRO      METABOLIC PANEL, BASIC   2. Encounter for vasectomy counseling Z30.09 V25.09 REFERRAL TO UROLOGY   3. Prediabetes R73.03 790.29 HEMOGLOBIN A1C WITH EAG      METABOLIC PANEL, BASIC   4. BMI 31.0-31.9,adult Z68.31 V85.31 HEMOGLOBIN A1C WITH EAG   5. Vitamin D deficiency E55.9 268.9 VITAMIN D, 25 HYDROXY     Intermittent MSK back pain likely due to poor posture. No neurosensory deficit. I have discussed the diagnosis with the patient and the intended plan as seen in the above orders. The patient has received an after-visit summary and questions were answered concerning future plans. Medication Side Effects and Warnings were discussed with patient.     Follow-up Disposition: Not on R Jazmine Dhaliwal MD

## 2017-07-24 NOTE — MR AVS SNAPSHOT
Visit Information Date & Time Provider Department Dept. Phone Encounter #  
 7/24/2017  8:45 AM [facility] [address] [place] [address] [phone] [id number] Follow-up Instructions Return in about 2 months (around 9/24/2017) for Lab only, weight check. Upcoming Health Maintenance Date Due DTaP/Tdap/Td series (1 - Tdap) 2/15/2009 INFLUENZA AGE 9 TO ADULT 8/1/2017 Allergies as of 7/24/2017  Review Complete On: 7/24/2017 By: Zaire Coleman No Known Allergies Current Immunizations  Never Reviewed No immunizations on file. Not reviewed this visit You Were Diagnosed With   
  
 Codes Comments Hematuria    -  Primary ICD-10-CM: R31.9 ICD-9-CM: 599.70 Encounter for vasectomy counseling     ICD-10-CM: Z30.09 
ICD-9-CM: V25.09 Prediabetes     ICD-10-CM: R73.03 
ICD-9-CM: 790.29 BMI 31.0-31.9,adult     ICD-10-CM: Z68.31 
ICD-9-CM: V85.31 Vitamin D deficiency     ICD-10-CM: E55.9 ICD-9-CM: 268.9 Vitals BP Pulse Temp Resp Height(growth percentile) Weight(growth percentile) 107/67 (BP 1 Location: Right arm, BP Patient Position: Sitting) 79 98.2 °F (36.8 °C) (Oral) 16 5' 9\" (1.753 m) 214 lb 9.6 oz (97.3 kg) SpO2 BMI Smoking Status 100% 31.69 kg/m2 Never Smoker Vitals History BMI and BSA Data Body Mass Index Body Surface Area  
 31.69 kg/m 2 2.18 m 2 Preferred Pharmacy Pharmacy Name Phone [id number] [address] [phone] Your Updated Medication List  
  
Notice  As of 7/24/2017  9:41 AM  
 You have not been prescribed any medications. We Performed the Following AMB POC URINALYSIS DIP STICK AUTO W/O MICRO [10393 CPT(R)] HEMOGLOBIN A1C WITH EAG [38330 CPT(R)] METABOLIC PANEL, BASIC [57795 CPT(R)] REFERRAL TO UROLOGY [FEA986 Custom] Comments: Please evaluate patient for consult for vasectomy. VITAMIN D, 25 HYDROXY [id number] CPT(R)] Follow-up Instructions Return in about 2 months (around 9/24/2017) for Lab only, weight check. Referral Information Referral ID Referred By Referred To  
  
 [id number] Dorie WICK MD   
   [facility] [address], [address],[place] Phone: [phone] Fax: [phone] Visits Status Start Date End Date 1 New Request 7/24/17 7/24/18 If your referral has a status of pending review or denied, additional information will be sent to support the outcome of this decision. Introducing [facility]! [employer] introduces AngioSlide patient portal. Now you can access parts of your medical record, email your doctor's office, and request medication refills online. 1. In your internet browser, go to [email or web address] 2. Click on the First Time User? Click Here link in the Sign In box. You will see the New Member Sign Up page. 3. Enter your AngioSlide Access Code exactly as it appears below. You will not need to use this code after youve completed the sign-up process. If you do not sign up before the expiration date, you must request a new code. · AngioSlide Access Code: [email or web address] Expires: 9/6/2017 12:13 PM 
 
4. Enter the last four digits of your Social Security Number (xxxx) and Date of Birth (mm/dd/yyyy) as indicated and click Submit. You will be taken to the next sign-up page. 5. Create a Likeedst ID. This will be your AngioSlide login ID and cannot be changed, so think of one that is secure and easy to remember. 6. Create a AngioSlide password. You can change your password at any time. 7. Enter your Password Reset Question and Answer. This can be used at a later time if you forget your password. 8. Enter your e-mail address. You will receive e-mail notification when new information is available in [address] 9. Click Sign Up. You can now view and download portions of your medical record. 10. Click the Download Summary menu link to download a portable copy of your medical information. If you have questions, please visit the Frequently Asked Questions section of the UserZoom website. Remember, UserZoom is NOT to be used for urgent needs. For medical emergencies, dial 911. Now available from your iPhone and Android! Please provide this summary of care documentation to your next provider. Your primary care clinician is listed as Patti Rodriguez. If you have any questions after today's visit, please call [phone].

## 2017-07-24 NOTE — PATIENT INSTRUCTIONS
Hemoglobin A1c: About This Test  What is it? Hemoglobin A1c is a blood test that checks your average blood sugar level over the past 2 to 3 months. This test also is called a glycohemoglobin test or an A1c test.  Why is this test done? The A1c test is done to check how well your diabetes has been controlled over the past 2 to 3 months. Your doctor can use this information to adjust your medicine and diabetes treatment, if needed. How can you prepare for the test?  You do not need to stop eating before you have an A1c test. This test can be done at any time during the day, even after a meal.  What happens during the test?  The health professional taking a sample of your blood will:  · Wrap an elastic band around your upper arm. This makes the veins below the band larger so it is easier to put a needle into the vein. · Clean the needle site with alcohol. · Put the needle into the vein. · Attach a tube to the needle to fill it with blood. · Remove the band from your arm when enough blood is collected. · Put a gauze pad or cotton ball over the needle site as the needle is removed. · Put pressure on the site and then put on a bandage. What else should you know about the test?  The test result is usually given as a percentage. The normal A1c is less than 5.7%. The A1c test result also can be used to find your estimated average glucose, or eAG. Your eAG and A1c show the same thing in two different ways. They both help you learn more about your average blood sugar range over the past 2 to 3 months. Where can you learn more? Go to http://robyn-shani.info/. Enter U216 in the search box to learn more about \"Hemoglobin A1c: About This Test.\"  Current as of: March 13, 2017  Content Version: 11.3  © 7005-0366 [employer]. Care instructions adapted under license by [employer] (which disclaims liability or warranty for this information).  If you have questions about a medical condition or this instruction, always ask your healthcare professional. [place] [address] any warranty or liability for your use of this information.

## 2019-03-21 ENCOUNTER — OFFICE VISIT (OUTPATIENT)
Dept: FAMILY MEDICINE CLINIC | Age: 29
End: 2019-03-21

## 2019-03-21 VITALS
HEART RATE: 77 BPM | WEIGHT: 228 LBS | OXYGEN SATURATION: 98 % | BODY MASS INDEX: 33.77 KG/M2 | DIASTOLIC BLOOD PRESSURE: 75 MMHG | HEIGHT: 69 IN | TEMPERATURE: 98.4 F | RESPIRATION RATE: 16 BRPM | SYSTOLIC BLOOD PRESSURE: 122 MMHG

## 2019-03-21 DIAGNOSIS — R73.03 PREDIABETES: ICD-10-CM

## 2019-03-21 DIAGNOSIS — Z00.00 ANNUAL PHYSICAL EXAM: Primary | ICD-10-CM

## 2019-03-21 DIAGNOSIS — E55.9 VITAMIN D DEFICIENCY: ICD-10-CM

## 2019-03-21 NOTE — PROGRESS NOTES
Kaylah Robles is a 30 y.o. male Chief Complaint Patient presents with  Complete Physical  
  New Patient visit  (  patient is fasting ) 1. Have you been to the ER, urgent care clinic since your last visit? Hospitalized since your last visit?  no 
 
 
2. Have you seen or consulted any other health care providers outside of the [address] [place] [address] since your last visit? Include any pap smears or colon screening.   No

## 2019-03-21 NOTE — PROGRESS NOTES
CC: \"check up\" History of Present Illness: 
Mat Long is a 30 y.o. male. He is new to the clinic. He has history of prediabetes and vitamin D deficiency. He does not take any medications. He does not eat healthy. He does not exercise. He works at night. He is sexually active with one female partner. He does not use condoms. He declines STD testing. He declines the flu shot. He is unsure when his last tetanus booster was. He will check on this. He does not smoke or drink alcohol. No Known Allergies Patient Active Problem List  
Diagnosis Code  Prediabetes R73.03  
 BMI 31.0-31.9,adult Z68.31  
 Prehypertension R03.0  Vitamin D deficiency E55.9 History reviewed. No pertinent past medical history. History reviewed. No pertinent surgical history. Social History Socioeconomic History  Marital status: SINGLE Spouse name: Not on file  Number of children: Not on file  Years of education: Not on file  Highest education level: Not on file Tobacco Use  Smoking status: Never Smoker  Smokeless tobacco: Never Used Substance and Sexual Activity  Alcohol use: No  
 Drug use: Never  Sexual activity: Yes  
  Partners: Female Family History Problem Relation Age of Onset  No Known Problems Mother  No Known Problems Father  Diabetes Paternal Grandmother  Hypertension Paternal Grandfather Review of Systems Constitutional: Negative for chills and fever. Eyes: Negative for blurred vision and double vision. Respiratory: Negative for cough and shortness of breath. Cardiovascular: Negative for chest pain and leg swelling. Gastrointestinal: Negative for abdominal pain, constipation, diarrhea, nausea and vomiting. Genitourinary: Negative for dysuria and hematuria. Musculoskeletal: Negative for joint pain. Skin: Negative for rash. Neurological: Negative for dizziness and headaches. Physical Exam: 
Visit Vitals /75 (BP 1 Location: Right arm, BP Patient Position: Sitting) Pulse 77 Temp 98.4 °F (36.9 °C) (Oral) Resp 16 Ht 5' 9\" (1.753 m) Wt 228 lb (103.4 kg) SpO2 98% BMI 33.67 kg/m² Physical Exam  
Constitutional: He is oriented to person, place, and time. He appears well-developed and well-nourished. No distress. HENT:  
Head: Normocephalic. Right Ear: External ear normal.  
Left Ear: External ear normal.  
Mouth/Throat: Oropharynx is clear and moist.  
Eyes: Conjunctivae are normal.  
Neck: No thyromegaly present. Cardiovascular: Normal rate, regular rhythm and normal heart sounds. Pulmonary/Chest: Effort normal and breath sounds normal.  
Abdominal: Soft. Bowel sounds are normal. He exhibits no distension and no mass. There is no tenderness. Musculoskeletal: Normal range of motion. He exhibits no edema or deformity. Neurological: He is alert and oriented to person, place, and time. He exhibits normal muscle tone. Skin: Skin is warm and dry. Psychiatric: He has a normal mood and affect. Assessment/Plan: 1. Annual physical exam 
Counseled regarding healthy diet and regular cardiovascular exercise. Will check labs as below. He refuses flu shot. He will check to see when his last tetanus booster was and let me know. 2. Vitamin D deficiency 
- VITAMIN D, 25 HYDROXY 3. Prediabetes 
- HEMOGLOBIN A1C WITH EAG 
- METABOLIC PANEL, BASIC 
- LIPID PANEL Follow up in 6 months. Diagnoses, tests, plan, and follow up discussed with patient. I have given to and reviewed with the patient the after visit summary. I have reviewed medication side effects and precautions. Patient expressed agreement and understanding. All questions were answered. Discussed with Dr. Felisha Crawford.

## 2019-03-21 NOTE — PATIENT INSTRUCTIONS

## 2019-03-22 ENCOUNTER — TELEPHONE (OUTPATIENT)
Dept: FAMILY MEDICINE CLINIC | Age: 29
End: 2019-03-22

## 2019-03-22 LAB
25(OH)D3+25(OH)D2 SERPL-MCNC: 11.6 NG/ML (ref 30–100)
BUN SERPL-MCNC: 7 MG/DL (ref 6–20)
BUN/CREAT SERPL: 7 (ref 9–20)
CALCIUM SERPL-MCNC: 9.4 MG/DL (ref 8.7–10.2)
CHLORIDE SERPL-SCNC: 103 MMOL/L (ref 96–106)
CHOLEST SERPL-MCNC: 137 MG/DL (ref 100–199)
CO2 SERPL-SCNC: 22 MMOL/L (ref 20–29)
CREAT SERPL-MCNC: 0.99 MG/DL (ref 0.76–1.27)
EST. AVERAGE GLUCOSE BLD GHB EST-MCNC: 131 MG/DL
GLUCOSE SERPL-MCNC: 86 MG/DL (ref 65–99)
HBA1C MFR BLD: 6.2 % (ref 4.8–5.6)
HDLC SERPL-MCNC: 34 MG/DL
LDLC SERPL CALC-MCNC: 93 MG/DL (ref 0–99)
POTASSIUM SERPL-SCNC: 4.7 MMOL/L (ref 3.5–5.2)
SODIUM SERPL-SCNC: 139 MMOL/L (ref 134–144)
TRIGL SERPL-MCNC: 49 MG/DL (ref 0–149)
VLDLC SERPL CALC-MCNC: 10 MG/DL (ref 5–40)

## 2019-03-22 NOTE — TELEPHONE ENCOUNTER
Called patient regarding labs. A1c in prediabetes range, HDL a little low. Work on diet and exercise. Vitamin D is low. Take 1000 international units of OTC vitamin D daily. Follow up in 3 months to recheck vitamin D. He understands and agrees and was appreciative of the call.

## 2020-12-17 ENCOUNTER — OFFICE VISIT (OUTPATIENT)
Dept: INTERNAL MEDICINE CLINIC | Age: 30
End: 2020-12-17
Payer: COMMERCIAL

## 2020-12-17 VITALS
DIASTOLIC BLOOD PRESSURE: 80 MMHG | BODY MASS INDEX: 32.73 KG/M2 | HEIGHT: 69 IN | WEIGHT: 221 LBS | HEART RATE: 70 BPM | OXYGEN SATURATION: 98 % | TEMPERATURE: 98.5 F | SYSTOLIC BLOOD PRESSURE: 110 MMHG | RESPIRATION RATE: 16 BRPM

## 2020-12-17 DIAGNOSIS — G89.29 CHRONIC RIGHT SHOULDER PAIN: ICD-10-CM

## 2020-12-17 DIAGNOSIS — M25.511 CHRONIC RIGHT SHOULDER PAIN: ICD-10-CM

## 2020-12-17 DIAGNOSIS — Z23 ENCOUNTER FOR IMMUNIZATION: ICD-10-CM

## 2020-12-17 DIAGNOSIS — R73.03 PREDIABETES: ICD-10-CM

## 2020-12-17 DIAGNOSIS — E55.9 VITAMIN D DEFICIENCY: ICD-10-CM

## 2020-12-17 DIAGNOSIS — Z00.00 WELLNESS EXAMINATION: Primary | ICD-10-CM

## 2020-12-17 PROCEDURE — 99395 PREV VISIT EST AGE 18-39: CPT | Performed by: FAMILY MEDICINE

## 2020-12-17 PROCEDURE — 90000 NO LOS: CPT | Performed by: FAMILY MEDICINE

## 2020-12-17 PROCEDURE — 99213 OFFICE O/P EST LOW 20 MIN: CPT | Performed by: FAMILY MEDICINE

## 2020-12-17 PROCEDURE — 90715 TDAP VACCINE 7 YRS/> IM: CPT | Performed by: FAMILY MEDICINE

## 2020-12-17 PROCEDURE — 90471 IMMUNIZATION ADMIN: CPT | Performed by: FAMILY MEDICINE

## 2020-12-17 NOTE — PATIENT INSTRUCTIONS
Prediabetes: Care Instructions  Overview     Prediabetes is a warning sign that you're at risk for getting type 2 diabetes. It means that your blood sugar is higher than it should be. But it's not high enough to be diabetes. The food you eat naturally turns into sugar. Your body uses the sugar for energy. Normally, an organ called the pancreas makes insulin. And insulin allows the sugar in your blood to get into your body's cells. But sometimes the body can't use insulin the right way. So the sugar stays in your blood instead. This is called insulin resistance. The buildup of sugar in your blood means you have prediabetes. The good news is that you may be able to prevent or delay diabetes. Making small lifestyle changes, like getting active and changing your eating habits, may help you get your blood sugar back to normal. You can work with your doctor to make a treatment plan. Follow-up care is a key part of your treatment and safety. Be sure to make and go to all appointments, and call your doctor if you are having problems. It's also a good idea to know your test results and keep a list of the medicines you take. How can you care for yourself at home? · Watch your weight. A healthy weight helps your body use insulin properly. · Limit the amount of calories, sweets, and unhealthy fat you eat. Ask your doctor if you should see a dietitian. A registered dietitian can help you create meal plans that fit your lifestyle. · Get at least 30 minutes of exercise on most days of the week. Exercise helps control your blood sugar. It also helps you maintain a healthy weight. Walking is a good choice. You also may want to do other activities, such as running, swimming, cycling, or playing tennis or team sports. · Do not smoke. Smoking can make prediabetes worse. If you need help quitting, talk to your doctor about stop-smoking programs and medicines. These can increase your chances of quitting for good.   · If your doctor prescribed medicines, take them exactly as prescribed. Call your doctor if you think you are having a problem with your medicine. You will get more details on the specific medicines your doctor prescribes. When should you call for help? Watch closely for changes in your health, and be sure to contact your doctor if:    · You have any symptoms of diabetes. These may include:  ? Being thirsty more often. ? Urinating more. ? Being hungrier. ? Losing weight. ? Being very tired. ? Having blurry vision.     · You have a wound that will not heal.     · You have an infection that will not go away.     · You have problems with your blood pressure.     · You want more information about diabetes and how you can keep from getting it. Where can you learn more? Go to http://www.gray.com/  Enter I222 in the search box to learn more about \"Prediabetes: Care Instructions. \"  Current as of: December 20, 2019               Content Version: 12.6  © 1868-4418 [employer]. Care instructions adapted under license by [employer] (which disclaims liability or warranty for this information). If you have questions about a medical condition or this instruction, always ask your healthcare professional. [address] any warranty or liability for your use of this information. Learning About Vitamin D  Why is it important to get enough vitamin D? Your body needs vitamin D to absorb calcium. Calcium keeps your bones and muscles, including your heart, healthy and strong. If your muscles don't get enough calcium, they can cramp, hurt, or feel weak. You may have long-term (chronic) muscle aches and pains. If you don't get enough vitamin D throughout life, you have an increased chance of having thin and brittle bones (osteoporosis) in your later years. Children who don't get enough vitamin D may not grow as much as others their age.  They also have a chance of getting a rare disease called rickets. It causes weak bones. Vitamin D and calcium are added to many foods. And your body uses sunshine to make its own vitamin D. How much vitamin D do you need? The [place] of [employer] recommends that people ages 3 through 79 get 600 IU (international units) every day. Adults 71 and older need 800 IU every day. Blood tests for vitamin D can check your vitamin D level. But there is no standard normal range used by all laboratories. You're likely getting enough vitamin D if your levels are in the range of 20 to 50 ng/mL. How can you get more vitamin D? Foods that contain vitamin D include:  · [place], tuna, and mackerel. These are some of the best foods to eat when you need to get more vitamin D.  · Cheese, egg yolks, and beef liver. These foods have vitamin D in small amounts. · Milk, soy drinks, orange juice, yogurt, margarine, and some kinds of cereal have vitamin D added to them. Some people don't make vitamin D as well as others. They may have to take extra care in getting enough vitamin D. Things that reduce how much vitamin D your body makes include:  · Dark skin, such as many  Americans have. · Age, especially if you are older than 72. · Digestive problems, such as Crohn's or celiac disease. · Liver and kidney disease. Some people who do not get enough vitamin D may need supplements. Are there any risks from taking vitamin D?  · Too much vitamin D:  ? Can damage your kidneys. ? Can cause nausea and vomiting, constipation, and weakness. ? Raises the amount of calcium in your blood. If this happens, you can get confused or have an irregular heart rhythm. · Vitamin D may interact with other medicines. Tell your doctor about all of the medicines you take, including over-the-counter drugs, herbs, and pills. Tell your doctor about all of your current medical problems. Where can you learn more?   Go to http://www.gray.com/  Enter V530 in the search box to learn more about \"Learning About Vitamin D.\"  Current as of: August 22, 2019               Content Version: 12.6  © 1933-4525 [employer]. Care instructions adapted under license by [employer] (which disclaims liability or warranty for this information). If you have questions about a medical condition or this instruction, always ask your healthcare professional. [address] any warranty or liability for your use of this information. Body Mass Index: Care Instructions  Your Care Instructions     Body mass index (BMI) can help you see if your weight is raising your risk for health problems. It uses a formula to compare how much you weigh with how tall you are. · A BMI lower than 18.5 is considered underweight. · A BMI between 18.5 and 24.9 is considered healthy. · A BMI between 25 and 29.9 is considered overweight. A BMI of 30 or higher is considered obese. If your BMI is in the normal range, it means that you have a lower risk for weight-related health problems. If your BMI is in the overweight or obese range, you may be at increased risk for weight-related health problems, such as high blood pressure, heart disease, stroke, arthritis or joint pain, and diabetes. If your BMI is in the underweight range, you may be at increased risk for health problems such as fatigue, lower protection (immunity) against illness, muscle loss, bone loss, hair loss, and hormone problems. BMI is just one measure of your risk for weight-related health problems. You may be at higher risk for health problems if you are not active, you eat an unhealthy diet, or you drink too much alcohol or use tobacco products. Follow-up care is a key part of your treatment and safety. Be sure to make and go to all appointments, and call your doctor if you are having problems.  It's also a good idea to know your test results and keep a list of the medicines you take. How can you care for yourself at home? · Practice healthy eating habits. This includes eating plenty of fruits, vegetables, whole grains, lean protein, and low-fat dairy. · If your doctor recommends it, get more exercise. Walking is a good choice. Bit by bit, increase the amount you walk every day. Try for at least 30 minutes on most days of the week. · Do not smoke. Smoking can increase your risk for health problems. If you need help quitting, talk to your doctor about stop-smoking programs and medicines. These can increase your chances of quitting for good. · Limit alcohol to 2 drinks a day for men and 1 drink a day for women. Too much alcohol can cause health problems. If you have a BMI higher than 25  · Your doctor may do other tests to check your risk for weight-related health problems. This may include measuring the distance around your waist. A waist measurement of more than 40 inches in men or 35 inches in women can increase the risk of weight-related health problems. · Talk with your doctor about steps you can take to stay healthy or improve your health. You may need to make lifestyle changes to lose weight and stay healthy, such as changing your diet and getting regular exercise. If you have a BMI lower than 18.5  · Your doctor may do other tests to check your risk for health problems. · Talk with your doctor about steps you can take to stay healthy or improve your health. You may need to make lifestyle changes to gain or maintain weight and stay healthy, such as getting more healthy foods in your diet and doing exercises to build muscle. Where can you learn more? Go to http://www.[employer].com/  Enter S176 in the search box to learn more about \"Body Mass Index: Care Instructions. \"  Current as of: December 11, 2019               Content Version: 12.6  © 7724-2167 [employer].    Care instructions adapted under license by [address] (which disclaims liability or warranty for this information). If you have questions about a medical condition or this instruction, always ask your healthcare professional. [address] any warranty or liability for your use of this information. Elevated Blood Pressure: Care Instructions  Your Care Instructions    Blood pressure is a measure of how hard the blood pushes against the walls of your arteries. It's normal for blood pressure to go up and down throughout the day. But if it stays up over time, you have high blood pressure. Two numbers tell you your blood pressure. The first number is the systolic pressure. It shows how hard the blood pushes when your heart is pumping. The second number is the diastolic pressure. It shows how hard the blood pushes between heartbeats, when your heart is relaxed and filling with blood. An ideal blood pressure in adults is less than 120/80 (say \"120 over 80\"). High blood pressure is 140/90 or higher. You have high blood pressure if your top number is 140 or higher or your bottom number is 90 or higher, or both. The main test for high blood pressure is simple, fast, and painless. To diagnose high blood pressure, your doctor will test your blood pressure at different times. After testing your blood pressure, your doctor may ask you to test it again when you are home. If you are diagnosed with high blood pressure, you can work with your doctor to make a long-term plan to manage it. Follow-up care is a key part of your treatment and safety. Be sure to make and go to all appointments, and call your doctor if you are having problems. It's also a good idea to know your test results and keep a list of the medicines you take. How can you care for yourself at home? · Do not smoke. Smoking increases your risk for heart attack and stroke.  If you need help quitting, talk to your doctor about stop-smoking programs and medicines. These can increase your chances of quitting for good. · Stay at a healthy weight. · Try to limit how much sodium you eat to less than 2,300 milligrams (mg) a day. Your doctor may ask you to try to eat less than 1,500 mg a day. · Be physically active. Get at least 30 minutes of exercise on most days of the week. Walking is a good choice. You also may want to do other activities, such as running, swimming, cycling, or playing tennis or team sports. · Avoid or limit alcohol. Talk to your doctor about whether you can drink any alcohol. · Eat plenty of fruits, vegetables, and low-fat dairy products. Eat less saturated and total fats. · Learn how to check your blood pressure at home. When should you call for help? Call your doctor now or seek immediate medical care if:  ? · Your blood pressure is much higher than normal (such as 180/110 or higher). ? · You think high blood pressure is causing symptoms such as:  ¨ Severe headache. ¨ Blurry vision. ? Watch closely for changes in your health, and be sure to contact your doctor if:  ? · You do not get better as expected. Where can you learn more? Go to http://www.gray.com/. Enter K314 in the search box to learn more about \"Elevated Blood Pressure: Care Instructions. \"  Current as of: September 21, 2016  Content Version: 11.4  © 0480-8021 [employer]. Care instructions adapted under license by [employer] (which disclaims liability or warranty for this information). If you have questions about a medical condition or this instruction, always ask your healthcare professional. [place] [address] any warranty or liability for your use of this information. Well Visit, Ages 25 to 48: Care Instructions  Your Care Instructions     Physical exams can help you stay healthy. Your doctor has checked your overall health and may have suggested ways to take good care of yourself.  He or she also may have recommended tests. At home, you can help prevent illness with healthy eating, regular exercise, and other steps. Follow-up care is a key part of your treatment and safety. Be sure to make and go to all appointments, and call your doctor if you are having problems. It's also a good idea to know your test results and keep a list of the medicines you take. How can you care for yourself at home? · Reach and stay at a healthy weight. This will lower your risk for many problems, such as obesity, diabetes, heart disease, and high blood pressure. · Get at least 30 minutes of physical activity on most days of the week. Walking is a good choice. You also may want to do other activities, such as running, swimming, cycling, or playing tennis or team sports. Discuss any changes in your exercise program with your doctor. · Do not smoke or allow others to smoke around you. If you need help quitting, talk to your doctor about stop-smoking programs and medicines. These can increase your chances of quitting for good. · Talk to your doctor about whether you have any risk factors for sexually transmitted infections (STIs). Having one sex partner (who does not have STIs and does not have sex with anyone else) is a good way to avoid these infections. · Use birth control if you do not want to have children at this time. Talk with your doctor about the choices available and what might be best for you. · Protect your skin from too much sun. When you're outdoors from 10 a.m. to 4 p.m., stay in the shade or cover up with clothing and a hat with a wide brim. Wear sunglasses that block UV rays. Even when it's cloudy, put broad-spectrum sunscreen (SPF 30 or higher) on any exposed skin. · See a dentist one or two times a year for checkups and to have your teeth cleaned. · Wear a seat belt in the car. Follow your doctor's advice about when to have certain tests. These tests can spot problems early. For everyone  · Cholesterol. Have the fat (cholesterol) in your blood tested after age 21. Your doctor will tell you how often to have this done based on your age, family history, or other things that can increase your risk for heart disease. · Blood pressure. Have your blood pressure checked during a routine doctor visit. Your doctor will tell you how often to check your blood pressure based on your age, your blood pressure results, and other factors. · Vision. Talk with your doctor about how often to have a glaucoma test.  · Diabetes. Ask your doctor whether you should have tests for diabetes. · Colon cancer. Your risk for colorectal cancer gets higher as you get older. Some experts say that adults should start regular screening at age 48 and stop at age 76. Others say to start before age 48 or continue after age 76. Talk with your doctor about your risk and when to start and stop screening. For women  · Breast exam and mammogram. Talk to your doctor about when you should have a clinical breast exam and a mammogram. Medical experts differ on whether and how often women under 50 should have these tests. Your doctor can help you decide what is right for you. · Cervical cancer screening test and pelvic exam. Begin with a Pap test at age 24. The test often is part of a pelvic exam. Starting at age 27, you may choose to have a Pap test, an HPV test, or both tests at the same time (called co-testing). Talk with your doctor about how often to have testing. · Tests for sexually transmitted infections (STIs). Ask whether you should have tests for STIs. You may be at risk if you have sex with more than one person, especially if your partners do not wear condoms. For men  · Tests for sexually transmitted infections (STIs). Ask whether you should have tests for STIs. You may be at risk if you have sex with more than one person, especially if you do not wear a condom.   · Testicular cancer exam. Ask your doctor whether you should check your testicles regularly. · Prostate exam. Talk to your doctor about whether you should have a blood test (called a PSA test) for prostate cancer. Experts differ on whether and when men should have this test. Some experts suggest it if you are older than 39 and are -American or have a father or brother who got prostate cancer when he was younger than 72. When should you call for help? Watch closely for changes in your health, and be sure to contact your doctor if you have any problems or symptoms that concern you. Where can you learn more? Go to http://www.wood.com/  Enter P072 in the search box to learn more about \"Well Visit, Ages 25 to 48: Care Instructions. \"  Current as of: May 27, 2020               Content Version: 12.6  © 5673-1008 [employer]. Care instructions adapted under license by [employer] (which disclaims liability or warranty for this information). If you have questions about a medical condition or this instruction, always ask your healthcare professional. [address] any warranty or liability for your use of this information.

## 2020-12-17 NOTE — PROGRESS NOTES
SPORTS MEDICINE AND PRIMARY CARE  Thelda Eisenmenger. MD [facility]  [address] [place] [address]    Chief Complaint   Patient presents with   [facility] Visit     Patient is here for a wellness visit. SUBJECTIVE:    Becca Leggett is a 26 y.o. male returning after 3 years for wellness visit. Patient  reports chronic right shoulder popping. Range of motion is intact and patient denies stiffness, swelling or redness. 1.  Do you follow a low fat diet?  no  2. Are you up to date on your Tdap (<10 years)? no   3. Have you ever had a Pneumovax vaccine (>65)  na              PCV13               PPSV23   4. Have you had Zoster vaccine (>50)? na  5. Have you had the HPV - Gardasil (13- 46)? na  7. Do you smoke?  no  8. Do you consider yourself overweight?  yes  9. Is there a family history of CAD< age 48?   8. Is there a family history of Cancer? no   11. Do you know your Cancer risks? yes  12. Have you had a colonoscopy? na  13. Have you been tested for HIV or other STI's?  (18-69 y/o)? no  14. Have had a bone density scan or DEXA done(>65)? na  15. Have you had an EKG performed in the last five years (>50)? yes        Past Medical History:   Diagnosis Date    Arrhythmia     Headache      History reviewed. No pertinent surgical history.   No Known Allergies    REVIEW OF SYSTEMS:  General: negative for - chills or fever  ENT: negative for - headaches, nasal congestion, tinnitus, hearing loss, vision changes, sore throat  Respiratory: negative for - cough, hemoptysis, shortness of breath or wheezing  Cardiovascular : negative for - chest pain, edema, palpitations or shortness of breath  Gastrointestinal: negative for - abdominal pain, blood in stools, heartburn or nausea/vomiting, diarrhea, constipation  Genito-Urinary: no dysuria, trouble voiding, hematuria or erectile dysfunction  Musculoskeletal: negative for - gait disturbance, joint pain, joint stiffness , joint swelling, muscle aches  Neurological: no TIA or stroke symptoms  Hematologic: no bruises, no bleeding, no swollen glands  Integument: no lumps, mole changes, nail changes or rash  Endocrine:no malaise/lethargy or unexpected weight changes      Social History     Socioeconomic History    Marital status: SINGLE     Spouse name: Not on file    Number of children: Not on file    Years of education: Not on file    Highest education level: Not on file   Tobacco Use    Smoking status: Never Smoker    Smokeless tobacco: Never Used   Substance and Sexual Activity    Alcohol use: No    Drug use: Never    Sexual activity: Yes     Partners: Female     Family History   Problem Relation Age of Onset    No Known Problems Mother     No Known Problems Father     Diabetes Paternal Grandmother     Hypertension Paternal Grandfather        OBJECTIVE:     Visit Vitals  /80   Pulse 70   Temp 98.5 °F (36.9 °C)   Resp 16   Ht 5' 9\" (1.753 m)   Wt 221 lb (100.2 kg)   SpO2 98%   BMI 32.64 kg/m²     CONSTITUTIONAL: well developed, well nourished, appears age appropriate  EYES: perrla, eom intact  ENMT:moist mucous membranes, pharynx clear, Tm/canals are normal, nares patent  NECK: supple. Thyroid normal  RESPIRATORY: Chest: clear bilaterally  CARDIOVASCULAR: Heart: regular rate and rhythm pulse 2+   GASTROINTESTINAL: Abdomen: soft, bowel sounds active  HEMATOLOGIC: no pathological lymph nodes palpated  MUSCULOSKELETAL: Extremities: Shoulders appear normal on inspection; no inflammatory signs or crepitation noted; range of motion intact; no edema,  INTEGUMENT: No unusual rashes or suspicious skin lesions noted. Nails appear normal.  NEUROLOGIC: non-focal exam   MENTAL STATUS: alert and oriented, appropriate affect     ASSESSMENT:   1. Wellness examination    4. Chronic right shoulder pain        I have discussed the diagnosis with the patient and the intended plan as seen in the  orders . The patient understands and agrees with the plan. The patient has   received an after visit summary and questions were answered concerning  future plans  Patient labs and/or xrays were reviewed  Past records were reviewed. PLAN:  .  Orders Placed This Encounter    XR SHOULDER RT AP/LAT MIN 2 V    HEMOGLOBIN A1C WITH EAG    CBC WITH AUTOMATED DIFF    METABOLIC PANEL, COMPREHENSIVE    VITAMIN D, 25 HYDROXY    URINALYSIS W/ RFLX MICROSCOPIC    HIV 1/2 AG/AB, 4TH GENERATION,W RFLX CONFIRM     Counseled regarding diet, exercise and healthy lifestyle        Advised patient to call back or return to office if symptoms worsen/change/persist.  Discussed expected course/resolution/complications of diagnosis in detail with patient. Medication risks/benefits/costs/interactions/alternatives discussed with patient    Den Young M.D. A total of at least an additional 15 min was spent during this evaluation,outside of the wellness component,  of which half was spent in counseling and care coordination. This note was created using voice recognition software.   Edits have been made but syntax errors might exist.

## 2020-12-17 NOTE — PROGRESS NOTES
Chief Complaint   [facility] with   [facility]     Patient is here for a wellness visit. 1. Have you been to the ER, urgent care clinic since your last visit? Hospitalized since your last visit? No    2. Have you seen or consulted any other health care providers outside of the [address] [place] [address] since your last visit? Include any pap smears or colon screening.  No

## 2020-12-18 LAB
25(OH)D3 SERPL-MCNC: <9 NG/ML (ref 30–100)
ALBUMIN SERPL-MCNC: 4.1 G/DL (ref 3.5–5)
ALBUMIN/GLOB SERPL: 1.2 {RATIO} (ref 1.1–2.2)
ALP SERPL-CCNC: 103 U/L (ref 45–117)
ALT SERPL-CCNC: 37 U/L (ref 12–78)
ANION GAP SERPL CALC-SCNC: 7 MMOL/L (ref 5–15)
APPEARANCE UR: CLEAR
AST SERPL-CCNC: 20 U/L (ref 15–37)
BASOPHILS # BLD: 0 K/UL (ref 0–0.1)
BASOPHILS NFR BLD: 1 % (ref 0–1)
BILIRUB SERPL-MCNC: 0.5 MG/DL (ref 0.2–1)
BILIRUB UR QL: NEGATIVE
BUN SERPL-MCNC: 10 MG/DL (ref 6–20)
BUN/CREAT SERPL: 10 (ref 12–20)
CALCIUM SERPL-MCNC: 9 MG/DL (ref 8.5–10.1)
CHLORIDE SERPL-SCNC: 110 MMOL/L (ref 97–108)
CO2 SERPL-SCNC: 23 MMOL/L (ref 21–32)
COLOR UR: NORMAL
CREAT SERPL-MCNC: 1.02 MG/DL (ref 0.7–1.3)
DIFFERENTIAL METHOD BLD: NORMAL
EOSINOPHIL # BLD: 0.1 K/UL (ref 0–0.4)
EOSINOPHIL NFR BLD: 1 % (ref 0–7)
ERYTHROCYTE [DISTWIDTH] IN BLOOD BY AUTOMATED COUNT: 12.6 % (ref 11.5–14.5)
EST. AVERAGE GLUCOSE BLD GHB EST-MCNC: 114 MG/DL
GLOBULIN SER CALC-MCNC: 3.3 G/DL (ref 2–4)
GLUCOSE SERPL-MCNC: 92 MG/DL (ref 65–100)
GLUCOSE UR STRIP.AUTO-MCNC: NEGATIVE MG/DL
HBA1C MFR BLD: 5.6 % (ref 4–5.6)
HCT VFR BLD AUTO: 41.4 % (ref 36.6–50.3)
HGB BLD-MCNC: 13.6 G/DL (ref 12.1–17)
HGB UR QL STRIP: NEGATIVE
HIV 1+2 AB+HIV1 P24 AG SERPL QL IA: NONREACTIVE
HIV12 RESULT COMMENT, HHIVC: NORMAL
IMM GRANULOCYTES # BLD AUTO: 0 K/UL (ref 0–0.04)
IMM GRANULOCYTES NFR BLD AUTO: 0 % (ref 0–0.5)
KETONES UR QL STRIP.AUTO: NEGATIVE MG/DL
LEUKOCYTE ESTERASE UR QL STRIP.AUTO: NEGATIVE
LYMPHOCYTES # BLD: 2.1 K/UL (ref 0.8–3.5)
LYMPHOCYTES NFR BLD: 33 % (ref 12–49)
MCH RBC QN AUTO: 29.3 PG (ref 26–34)
MCHC RBC AUTO-ENTMCNC: 32.9 G/DL (ref 30–36.5)
MCV RBC AUTO: 89.2 FL (ref 80–99)
MONOCYTES # BLD: 0.6 K/UL (ref 0–1)
MONOCYTES NFR BLD: 10 % (ref 5–13)
NEUTS SEG # BLD: 3.5 K/UL (ref 1.8–8)
NEUTS SEG NFR BLD: 55 % (ref 32–75)
NITRITE UR QL STRIP.AUTO: NEGATIVE
NRBC # BLD: 0 K/UL (ref 0–0.01)
NRBC BLD-RTO: 0 PER 100 WBC
PH UR STRIP: 7.5 [PH] (ref 5–8)
PLATELET # BLD AUTO: 263 K/UL (ref 150–400)
PMV BLD AUTO: 11.3 FL (ref 8.9–12.9)
POTASSIUM SERPL-SCNC: 3.8 MMOL/L (ref 3.5–5.1)
PROT SERPL-MCNC: 7.4 G/DL (ref 6.4–8.2)
PROT UR STRIP-MCNC: NEGATIVE MG/DL
RBC # BLD AUTO: 4.64 M/UL (ref 4.1–5.7)
SODIUM SERPL-SCNC: 140 MMOL/L (ref 136–145)
SP GR UR REFRACTOMETRY: 1.02 (ref 1–1.03)
UROBILINOGEN UR QL STRIP.AUTO: 0.2 EU/DL (ref 0.2–1)
WBC # BLD AUTO: 6.3 K/UL (ref 4.1–11.1)

## 2022-01-13 ENCOUNTER — OFFICE VISIT (OUTPATIENT)
Dept: INTERNAL MEDICINE CLINIC | Age: 32
End: 2022-01-13
Payer: COMMERCIAL

## 2022-01-13 VITALS
HEIGHT: 68 IN | SYSTOLIC BLOOD PRESSURE: 122 MMHG | OXYGEN SATURATION: 98 % | RESPIRATION RATE: 18 BRPM | HEART RATE: 76 BPM | WEIGHT: 223.4 LBS | TEMPERATURE: 98.5 F | DIASTOLIC BLOOD PRESSURE: 80 MMHG | BODY MASS INDEX: 33.86 KG/M2

## 2022-01-13 DIAGNOSIS — Z11.59 ENCOUNTER FOR HEPATITIS C SCREENING TEST FOR LOW RISK PATIENT: ICD-10-CM

## 2022-01-13 DIAGNOSIS — M54.32 SCIATICA OF LEFT SIDE: ICD-10-CM

## 2022-01-13 DIAGNOSIS — Z00.00 WELLNESS EXAMINATION: Primary | ICD-10-CM

## 2022-01-13 DIAGNOSIS — R21 RASH: ICD-10-CM

## 2022-01-13 PROCEDURE — 99213 OFFICE O/P EST LOW 20 MIN: CPT | Performed by: FAMILY MEDICINE

## 2022-01-13 PROCEDURE — 99395 PREV VISIT EST AGE 18-39: CPT | Performed by: FAMILY MEDICINE

## 2022-01-13 NOTE — PATIENT INSTRUCTIONS
Well Visit, Ages 25 to 48: Care Instructions  Overview     Well visits can help you stay healthy. Your doctor has checked your overall health and may have suggested ways to take good care of yourself. Your doctor also may have recommended tests. At home, you can help prevent illness with healthy eating, regular exercise, and other steps. Follow-up care is a key part of your treatment and safety. Be sure to make and go to all appointments, and call your doctor if you are having problems. It's also a good idea to know your test results and keep a list of the medicines you take. How can you care for yourself at home? · Get screening tests that you and your doctor decide on. Screening helps find diseases before any symptoms appear. · Eat healthy foods. Choose fruits, vegetables, whole grains, protein, and low-fat dairy foods. Limit fat, especially saturated fat. Reduce salt in your diet. · Limit alcohol. If you are a man, have no more than 2 drinks a day or 14 drinks a week. If you are a woman, have no more than 1 drink a day or 7 drinks a week. · Get at least 30 minutes of physical activity on most days of the week. Walking is a good choice. You also may want to do other activities, such as running, swimming, cycling, or playing tennis or team sports. Discuss any changes in your exercise program with your doctor. · Reach and stay at a healthy weight. This will lower your risk for many problems, such as obesity, diabetes, heart disease, and high blood pressure. · Do not smoke or allow others to smoke around you. If you need help quitting, talk to your doctor about stop-smoking programs and medicines. These can increase your chances of quitting for good. · Care for your mental health. It is easy to get weighed down by worry and stress. Learn strategies to manage stress, like deep breathing and mindfulness, and stay connected with your family and community.  If you find you often feel sad or hopeless, talk with your doctor. Treatment can help. · Talk to your doctor about whether you have any risk factors for sexually transmitted infections (STIs). You can help prevent STIs if you wait to have sex with a new partner (or partners) until you've each been tested for STIs. It also helps if you use condoms (male or female condoms) and if you limit your sex partners to one person who only has sex with you. Vaccines are available for some STIs, such as HPV. · Use birth control if it's important to you to prevent pregnancy. Talk with your doctor about the choices available and what might be best for you. · If you think you may have a problem with alcohol or drug use, talk to your doctor. This includes prescription medicines (such as amphetamines and opioids) and illegal drugs (such as cocaine and methamphetamine). Your doctor can help you figure out what type of treatment is best for you. · Protect your skin from too much sun. When you're outdoors from 10 a.m. to 4 p.m., stay in the shade or cover up with clothing and a hat with a wide brim. Wear sunglasses that block UV rays. Even when it's cloudy, put broad-spectrum sunscreen (SPF 30 or higher) on any exposed skin. · See a dentist one or two times a year for checkups and to have your teeth cleaned. · Wear a seat belt in the car. When should you call for help? Watch closely for changes in your health, and be sure to contact your doctor if you have any problems or symptoms that concern you. Where can you learn more? Go to http://www.[employer].com/  Enter P072 in the search box to learn more about \"Well Visit, Ages 25 to 48: Care Instructions. \"  Current as of: February 11, 2021               Content Version: 13.0  © 3124-9074 [employer]. Care instructions adapted under license by [employer] (which disclaims liability or warranty for this information).  If you have questions about a medical condition or this instruction, always ask your healthcare professional. [place] [address] any warranty or liability for your use of this information.

## 2022-01-13 NOTE — PROGRESS NOTES
Chief Complaint   Patient presents with    Complete Physical     1. Have you been to the ER, urgent care clinic since your last visit? Hospitalized since your last visit? No    2. Have you seen or consulted any other health care providers outside of the [address] [place] [address] since your last visit? Include any pap smears or colon screening.  No  Visit Vitals  /80   Pulse 76   Temp 98.5 °F (36.9 °C) (Oral)   Resp 18   Ht 5' 8\" (1.727 m)   Wt 223 lb 6.4 oz (101.3 kg)   SpO2 98%   BMI 33.97 kg/m²

## 2022-01-13 NOTE — PROGRESS NOTES
[facility]  Roshni Nova. MD Yumi  [address] [place] [address]    Chief Complaint   Patient presents with    Complete Physical       SUBJECTIVE:    Abhijeet Barros is a 33 y.o. male for care establishment and CPE. Rash lt forearm x 2 mo, no itch, not spreading    Sciatic pain involves lt post hip, radiates down leg  hurt back playing w/ dog, other back trauma too    1. Do you follow a low fat diet?  no  2. Are you up to date on your Tdap (<10 years)?  not sure  3. Have you ever had a Pneumovax vaccine (>65)  na              PCV13               PPSV23   4. Have you had Zoster vaccine (>50)? na  5. Have you had the HPV - Gardasil (13- 46)? no  6. Have you had a screening test for hepatitis C virus? 7. Do you smoke? If > 65 and smoker, have you had a abdominal aortic aneurysm ultrasound screen? no       Do you agree to CT scan screening for lung cancer (age 52-81yo with 20pk-yr history and smokes or quit within          the past 15 yrs  8. Do you consider yourself overweight?  yes  9. Is there a family history of CAD< age 48? no  8. Is there a family history of Cancer?  no  11. Do you know your Cancer risks? yes  12. Have you had a colonoscopy? na  13. Have you been tested for HIV or other STI's?  (18-71 y/o)? yes  14. Have had a bone density scan or DEXA done(>65)? na  15. Have you had an EKG performed in the last five years (>50)? na  16. Have you had a PSA test done this year (50-69)? na  17. Do you follow a regular exercise program? No but walks dog every day    Past Medical History:   Diagnosis Date    Arrhythmia     Headache      History reviewed. No pertinent surgical history.   No Known Allergies    REVIEW OF SYSTEMS:  General: negative for - chills or fever  ENT: negative for - headaches, nasal congestion, tinnitus, hearing loss, vision changes, sore throat  Respiratory: negative for - cough, hemoptysis, shortness of breath or wheezing  Cardiovascular : negative for - chest pain, edema, palpitations or shortness of breath  Gastrointestinal: negative for - abdominal pain, blood in stools, heartburn or nausea/vomiting, diarrhea, constipation  Genito-Urinary: no dysuria, trouble voiding, hematuria or erectile dysfunction  Musculoskeletal: negative for - gait disturbance, joint pain, joint stiffness , joint swelling, muscle aches  Neurological: sciatic pain        no TIA or stroke symptoms  Hematologic: no bruises, no bleeding, no swollen glands  Integument: +rash     no lumps, mole changes, nail changes or rash  Endocrine:no malaise/lethargy or unexpected weight changes      Care gap  Flu vaccine- declined       Social History     Socioeconomic History    Marital status: SINGLE   Tobacco Use    Smoking status: Never Smoker    Smokeless tobacco: Never Used   Vaping Use    Vaping Use: Never used   Substance and Sexual Activity    Alcohol use: No    Drug use: Never    Sexual activity: Yes     Partners: Female     Family History   Problem Relation Age of Onset    No Known Problems Mother     No Known Problems Father     Diabetes Paternal Grandmother     Heart Disease Paternal Grandfather        OBJECTIVE:     Visit Vitals  /80   Pulse 76   Temp 98.5 °F (36.9 °C) (Oral)   Resp 18   Ht 5' 8\" (1.727 m)   Wt 223 lb 6.4 oz (101.3 kg)   SpO2 98%   BMI 33.97 kg/m²     General appearance: alert, cooperative, no distress, appears stated age  Head: Normocephalic, without obvious abnormality, atraumatic  Eyes: conjunctivae/corneas clear. PERRL, EOM's intact. Ears: normal TM's and external ear canals AU  Nose: Nares normal. Septum midline. Mucosa normal. No drainage or sinus tenderness. Throat: Lips, mucosa, and tongue normal. Teeth and gums normal  Neck: supple, symmetrical, trachea midline, no adenopathy, thyroid: not enlarged, symmetric, no tenderness/mass/nodules, no carotid bruit and no JVD  Back: negative, symmetric, no curvature.  ROM normal. No CVA tenderness. Lungs: clear to auscultation bilaterally  Breasts: not examined  Heart: regular rate and rhythm, S1, S2 normal, no murmur, click, rub or gallop  Abdomen: soft, non-tender. Bowel sounds normal. No masses,  no organomegaly  Pelvic: deferred  Extremities: extremities normal, atraumatic, no cyanosis or edema  Pulses: 2+ and symmetric  Skin: Skin color, texture, turgor normal. No rashes or lesions appreciated  Lymph nodes: Cervical, supraclavicular, and axillary nodes normal.  Neurologic: Alert and oriented , normal strength and tone. Normal coordination and gait  Psychiatric: stutter    ASSESSMENT/PLAN:  1. Wellness examination    2. Encounter for hepatitis C screening test for low risk patient    3. BMI 33.0-33.9,adult    4. Sciatica of left side    5. Rash      Sciatic pain history. Symptomatic treatment prn. Rash not appreciated on exa, pt advised to use heavy lotion. Report persistence. .  Orders Placed This Encounter    HEMOGLOBIN A1C WITH EAG    LIPID PANEL    CBC WITH AUTOMATED DIFF    METABOLIC PANEL, COMPREHENSIVE    URINALYSIS W/ RFLX MICROSCOPIC    HEPATITIS C AB           I have discussed the diagnosis with the patient and the intended plan as seen in the  orders above. The patient understands and agrees with the plan. The patient has   received an after visit summary. Questions were answered concerning  future plans  Patient labs and/or xrays were reviewed as available. Past records were reviewed as available. Counseled regarding diet, exercise and healthy lifestyle        Advised patient to  call back or return to office if symptoms develop/worsen/change/persist.           Leighann Carr M.D. This note was created using voice recognition software.   Edits have been made but syntax errors might exist.

## 2022-01-14 LAB
ALBUMIN SERPL-MCNC: 4.2 G/DL (ref 3.5–5)
ALBUMIN/GLOB SERPL: 1.2 {RATIO} (ref 1.1–2.2)
ALP SERPL-CCNC: 94 U/L (ref 45–117)
ALT SERPL-CCNC: 52 U/L (ref 12–78)
ANION GAP SERPL CALC-SCNC: 4 MMOL/L (ref 5–15)
APPEARANCE UR: CLEAR
AST SERPL-CCNC: 26 U/L (ref 15–37)
BACTERIA URNS QL MICRO: NEGATIVE /HPF
BASOPHILS # BLD: 0 K/UL (ref 0–0.1)
BASOPHILS NFR BLD: 0 % (ref 0–1)
BILIRUB SERPL-MCNC: 0.5 MG/DL (ref 0.2–1)
BILIRUB UR QL: NEGATIVE
BUN SERPL-MCNC: 11 MG/DL (ref 6–20)
BUN/CREAT SERPL: 11 (ref 12–20)
CALCIUM SERPL-MCNC: 9.4 MG/DL (ref 8.5–10.1)
CHLORIDE SERPL-SCNC: 108 MMOL/L (ref 97–108)
CHOLEST SERPL-MCNC: 152 MG/DL
CO2 SERPL-SCNC: 27 MMOL/L (ref 21–32)
COLOR UR: ABNORMAL
CREAT SERPL-MCNC: 1.03 MG/DL (ref 0.7–1.3)
DIFFERENTIAL METHOD BLD: NORMAL
EOSINOPHIL # BLD: 0.1 K/UL (ref 0–0.4)
EOSINOPHIL NFR BLD: 2 % (ref 0–7)
EPITH CASTS URNS QL MICRO: ABNORMAL /LPF
ERYTHROCYTE [DISTWIDTH] IN BLOOD BY AUTOMATED COUNT: 12.5 % (ref 11.5–14.5)
EST. AVERAGE GLUCOSE BLD GHB EST-MCNC: 120 MG/DL
GLOBULIN SER CALC-MCNC: 3.4 G/DL (ref 2–4)
GLUCOSE SERPL-MCNC: 91 MG/DL (ref 65–100)
GLUCOSE UR STRIP.AUTO-MCNC: NEGATIVE MG/DL
HBA1C MFR BLD: 5.8 % (ref 4–5.6)
HCT VFR BLD AUTO: 42.4 % (ref 36.6–50.3)
HCV AB SERPL QL IA: NONREACTIVE
HDLC SERPL-MCNC: 30 MG/DL
HDLC SERPL: 5.1 {RATIO} (ref 0–5)
HGB BLD-MCNC: 13.9 G/DL (ref 12.1–17)
HGB UR QL STRIP: NEGATIVE
HYALINE CASTS URNS QL MICRO: ABNORMAL /LPF (ref 0–5)
IMM GRANULOCYTES # BLD AUTO: 0 K/UL (ref 0–0.04)
IMM GRANULOCYTES NFR BLD AUTO: 0 % (ref 0–0.5)
KETONES UR QL STRIP.AUTO: ABNORMAL MG/DL
LDLC SERPL CALC-MCNC: 88.8 MG/DL (ref 0–100)
LEUKOCYTE ESTERASE UR QL STRIP.AUTO: NEGATIVE
LYMPHOCYTES # BLD: 1.9 K/UL (ref 0.8–3.5)
LYMPHOCYTES NFR BLD: 28 % (ref 12–49)
MCH RBC QN AUTO: 29.4 PG (ref 26–34)
MCHC RBC AUTO-ENTMCNC: 32.8 G/DL (ref 30–36.5)
MCV RBC AUTO: 89.8 FL (ref 80–99)
MONOCYTES # BLD: 0.6 K/UL (ref 0–1)
MONOCYTES NFR BLD: 9 % (ref 5–13)
NEUTS SEG # BLD: 4.2 K/UL (ref 1.8–8)
NEUTS SEG NFR BLD: 61 % (ref 32–75)
NITRITE UR QL STRIP.AUTO: NEGATIVE
NRBC # BLD: 0 K/UL (ref 0–0.01)
NRBC BLD-RTO: 0 PER 100 WBC
PH UR STRIP: 7 [PH] (ref 5–8)
PLATELET # BLD AUTO: 266 K/UL (ref 150–400)
PMV BLD AUTO: 11.2 FL (ref 8.9–12.9)
POTASSIUM SERPL-SCNC: 4.2 MMOL/L (ref 3.5–5.1)
PROT SERPL-MCNC: 7.6 G/DL (ref 6.4–8.2)
PROT UR STRIP-MCNC: ABNORMAL MG/DL
RBC # BLD AUTO: 4.72 M/UL (ref 4.1–5.7)
RBC #/AREA URNS HPF: ABNORMAL /HPF (ref 0–5)
SODIUM SERPL-SCNC: 139 MMOL/L (ref 136–145)
SP GR UR REFRACTOMETRY: 1.02 (ref 1–1.03)
TRIGL SERPL-MCNC: 166 MG/DL (ref ?–150)
UROBILINOGEN UR QL STRIP.AUTO: 1 EU/DL (ref 0.2–1)
VLDLC SERPL CALC-MCNC: 33.2 MG/DL
WBC # BLD AUTO: 6.8 K/UL (ref 4.1–11.1)
WBC URNS QL MICRO: ABNORMAL /HPF (ref 0–4)

## 2022-01-19 PROBLEM — Z00.00 WELLNESS EXAMINATION: Status: ACTIVE | Noted: 2022-01-19

## 2022-01-19 PROBLEM — M54.32 SCIATICA OF LEFT SIDE: Status: ACTIVE | Noted: 2022-01-19

## 2022-02-18 PROBLEM — Z00.00 WELLNESS EXAMINATION: Status: RESOLVED | Noted: 2022-01-19 | Resolved: 2022-02-18

## 2023-03-29 ENCOUNTER — OFFICE VISIT (OUTPATIENT)
Dept: FAMILY MEDICINE CLINIC | Age: 33
End: 2023-03-29
Payer: COMMERCIAL

## 2023-03-29 VITALS
TEMPERATURE: 97.5 F | HEIGHT: 68 IN | SYSTOLIC BLOOD PRESSURE: 122 MMHG | RESPIRATION RATE: 17 BRPM | OXYGEN SATURATION: 99 % | BODY MASS INDEX: 35.01 KG/M2 | HEART RATE: 77 BPM | WEIGHT: 231 LBS | DIASTOLIC BLOOD PRESSURE: 75 MMHG

## 2023-03-29 DIAGNOSIS — R73.03 PREDIABETES: ICD-10-CM

## 2023-03-29 DIAGNOSIS — Z00.00 ANNUAL PHYSICAL EXAM: Primary | ICD-10-CM

## 2023-03-29 LAB — HBA1C MFR BLD HPLC: 6.1 %

## 2023-03-29 PROCEDURE — 99395 PREV VISIT EST AGE 18-39: CPT | Performed by: FAMILY MEDICINE

## 2023-03-29 PROCEDURE — 83036 HEMOGLOBIN GLYCOSYLATED A1C: CPT | Performed by: FAMILY MEDICINE

## 2023-03-29 NOTE — PROGRESS NOTES
Manuelito Phipps  26 y.o. male  1990  [facility]  [facility]  Progress Note     Encounter Date: 3/29/2023      Assessment and Plan:    1. Annual physical exam  No new issues   Normal labs last year    2. Prediabetes  A1c 6.1 with low HDL and high TG  Advised following diabetic diet which is discussed. - AMB POC HEMOGLOBIN A1C    3. Infertility  He has two sons by previous relationship  He and girlfriend trying to get pregnant for a year  She is 39. Advised that she should see GYN to start infertility eval.    Diet, exercise and safety discussed with patient. Diagnoses and plans were discussed with the patient. After visit summary given to the patient as well. Follow-up and Dispositions    Return in about 1 year (around 3/29/2024) for Annual exam.         Tong Nicole MD        Manuelito Phipps is a 26 y.o. male who had concerns including Physical.    Worried     Health Maintenance  Immunizations:   Influenza: has not done  Tetanus: up to date   COVID had two original vaccine    Cancer screening:    Reviewed testicular, prostate, and colon cancer screening guidelines. Smoking: Non smoker      ETOH: Non drinker    Drugs: Never user    Sexual history: Monogamous heterosexual    Eye exam and Glaucoma Screening: up to date    Last dental exam: sees regularly      The following sections were reviewed & updated as appropriate: PMH, PSH, FH, and SH. Patient Active Problem List   Diagnosis Code    Prediabetes R73.03    BMI 33.0-33.9,adult Z68.33    Prehypertension R03.0    Vitamin D deficiency E55.9    Sciatica of left side M54.32          Prior to Admission medications    Not on File          No Known Allergies         Review of Systems   Respiratory:  Negative for shortness of breath. Cardiovascular:  Negative for chest pain and leg swelling. Gastrointestinal:  Negative for blood in stool. Genitourinary:  Negative for hematuria.    Psychiatric/Behavioral: Negative. Smoker:   Social History     Tobacco Use   Smoking Status Never   Smokeless Tobacco Never     ETOH:   Social History     Substance and Sexual Activity   Alcohol Use No       FH:    Family History   Problem Relation Age of Onset    No Known Problems Mother     No Known Problems Father     Diabetes Paternal Grandmother     Heart Disease Paternal Grandfather            Physical Exam:    Visit Vitals  /75 (BP 1 Location: Left upper arm, BP Patient Position: Sitting, BP Cuff Size: Large adult)   Pulse 77   Temp 97.5 °F (36.4 °C) (Temporal)   Resp 17   Ht 5' 8\" (1.727 m)   Wt 231 lb (104.8 kg)   SpO2 99%   BMI 35.12 kg/m²     Physical Exam  Physical Examination: General appearance - alert, well appearing, and in no distress, oriented to person, place, and time and appearing overweight  Mental status -  normal mood, behavior, speech, dress, motor activity, and thought processes, no expressed suicidal or homicidal ideation, no hallucinations  Ears - bilateral TM's and external ear canals normal  Nose - normal and patent, no erythema, discharge or polyps  Mouth - mucous membranes moist, pharynx normal without lesions  Neck - supple, no significant adenopathy  Chest - normal chest excursion, normal inspiratory and expiratory function. Clear to ausculation bilaterally. Heart - normal rate, regular rhythm, normal S1, S2, no murmurs, rubs, clicks or gallops, no extremity edema  Abdomen- benign, no organomegaly or masses  -normal penis and testicles,  Skin-no rashes or suspicious moles, tattoos  Neuro normal speech, moves all extremities, normal gait  Musculoskeletal- grossly normal joint and motor function.

## 2023-03-29 NOTE — PATIENT INSTRUCTIONS
The essentials of losing weight and a diabetic lower carbohydrate diet. Exercise  You should exercise 45- 60 minutes every day. This reduces the stored energy in your muscles and allows your insulin level to fall. You can only lose weight when your insulin level is low. Then you burn stored energy. 2.  Fasting   a. You should fast every night from 12-14 hours. b.  Lovely Hernandez are still using energy at night when you are sleeping and will burn stored energy. c.  Fasting allows you burn stored energy in your internal organs such as the liver. This allows the insulin level to drop.   d.  This allows you to start losing weight. 3.  No sugar!   a. You should try to eliminate sugar from your diet. b.  First, eliminate sweet drinks including:  sodas and praveen, sports drinks (like Gatorade or Poweraid), sweet tea and lemonade, wine (and beer), fruit juice (contains fruit sugar) and milk (contains milk sugar). c.  Eliminate sugary cereals, cookies and candies. No donuts, pastries or coffee cake. d.  Eat desserts only on special occasions:  family reunions, birthdays, anniversaries, major holidays. Eat only small desserts!   e. Start watching labels for foods that have added sugars. 4. Limit starches   a. Limit starches particularly:  bread, noodles, pasta, crackers and chips, rice, potatoes and fries. b.  Watch out for starchy vegetables:  corn and peas. c.  Women can have 30-45 grams of carbs per meal   d. Men can have 45-60 grams of carbs per meal   e. One piece of bread has 15-20 grams of carbs   f. One half cup of oatmeal, corn, rice, peas and cooked pasta have about 15 grams of carbs. 5.  Fruit-special case   a. Fruit has nutrients we need such as Vitamin C but also contains a lot of fruit sugar (fructose)   b. Fruit is not a good snack because fructose does not suppress your appetite. c.  Fruit can cause progression of fatty liver disease which makes weight loss harder   d.   Limit yourself to one serving of fruit per day and try to eat berries, small apples, oranges, roxy or grapefruit.           e.  Avoid high sugar fruits including mangos, bananas, grapes and cherries. f.  One serving of fruit is 1/2 to 3/4 of a cup.    6.  What do I eat instead?   a. Eat protein. It curbs your appetite longer than carbs do anyway. Eat meat, chicken, fish and eggs. b.  Eat healthy fats:  fish, olive oil, nuts   c. Eat more vegetables and salads. d.  Eat all of the above before you eat any carbs.   e.  Eat slowly and enjoy your food. f.  Drink more water. 7.  Snacks   a. Good snacks:  Cheese, nuts, Kind bars (minis), Protein bars, carrot sticks, celery sticks. b.  Read labels and look for snacks with low amounts of carbohydrate per serving (10 or less)   c. Try not to eat between meals. You'll lose more weight if you are not constantly putting energy into the system. 8.  Accountability   a. You have to keep yourself honest about your diet efforts. You need reminders to stick to your change in lifestyle and diet. b.  Weigh yourself daily   c.  Record your food intake either on an charles or in writing.   d.  Record your exercise. 9.  Support and emotional health. a.  Surround yourself with people that will help you and support your efforts. b. Avoid people that may sabotage your efforts or insist that they at least not undermine you.  c.  Be patient. An average patient loses only 3 pounds a month but that's 36 pounds at the end of a year. d.  Think long term. Try to keep up good exercise and diet habits. Once you get the weight off, keep it off.  e.  Pay attention to your emotions about food and your weight. Have a healthy attitude towards yourself and your body. 10.  When do I get to go back to eating the way I did before? Answer: Never. If you resume your old patterns of eating that caused your weight gain, you'll just gain the weight back.      Try to make permanent changes in how you live every day. It's not easy but you can do it.

## 2023-03-29 NOTE — PROGRESS NOTES
Chief Complaint   Patient presents with    Physical     1. Have you been to the ER, urgent care clinic since your last visit? Hospitalized since your last visit? No    2. Have you seen or consulted any other health care providers outside of the [address] [place] [address] since your last visit? Include any pap smears or colon screening.  No